# Patient Record
Sex: FEMALE | Race: WHITE | NOT HISPANIC OR LATINO | URBAN - METROPOLITAN AREA
[De-identification: names, ages, dates, MRNs, and addresses within clinical notes are randomized per-mention and may not be internally consistent; named-entity substitution may affect disease eponyms.]

---

## 2018-01-14 ENCOUNTER — INPATIENT (INPATIENT)
Facility: HOSPITAL | Age: 33
LOS: 4 days | Discharge: HOME | End: 2018-01-19
Attending: INTERNAL MEDICINE | Admitting: INTERNAL MEDICINE
Payer: COMMERCIAL

## 2018-01-14 VITALS
DIASTOLIC BLOOD PRESSURE: 75 MMHG | RESPIRATION RATE: 18 BRPM | TEMPERATURE: 98 F | OXYGEN SATURATION: 100 % | HEART RATE: 87 BPM | SYSTOLIC BLOOD PRESSURE: 133 MMHG

## 2018-01-14 DIAGNOSIS — Z90.49 ACQUIRED ABSENCE OF OTHER SPECIFIED PARTS OF DIGESTIVE TRACT: Chronic | ICD-10-CM

## 2018-01-14 DIAGNOSIS — Z98.84 BARIATRIC SURGERY STATUS: Chronic | ICD-10-CM

## 2018-01-14 DIAGNOSIS — F32.9 MAJOR DEPRESSIVE DISORDER, SINGLE EPISODE, UNSPECIFIED: Chronic | ICD-10-CM

## 2018-01-14 LAB
ALBUMIN SERPL ELPH-MCNC: 3.9 G/DL — SIGNIFICANT CHANGE UP (ref 3.3–5)
ALP SERPL-CCNC: 60 U/L — SIGNIFICANT CHANGE UP (ref 40–120)
ALT FLD-CCNC: 10 U/L — SIGNIFICANT CHANGE UP (ref 4–33)
APPEARANCE UR: CLEAR — SIGNIFICANT CHANGE UP
AST SERPL-CCNC: 11 U/L — SIGNIFICANT CHANGE UP (ref 4–32)
BASE EXCESS BLDV CALC-SCNC: 2 MMOL/L — SIGNIFICANT CHANGE UP
BASOPHILS # BLD AUTO: 0.07 K/UL — SIGNIFICANT CHANGE UP (ref 0–0.2)
BASOPHILS NFR BLD AUTO: 0.5 % — SIGNIFICANT CHANGE UP (ref 0–2)
BILIRUB SERPL-MCNC: 0.2 MG/DL — SIGNIFICANT CHANGE UP (ref 0.2–1.2)
BILIRUB UR-MCNC: NEGATIVE — SIGNIFICANT CHANGE UP
BLOOD GAS VENOUS - CREATININE: 0.64 MG/DL — SIGNIFICANT CHANGE UP (ref 0.5–1.3)
BLOOD UR QL VISUAL: NEGATIVE — SIGNIFICANT CHANGE UP
BUN SERPL-MCNC: 17 MG/DL — SIGNIFICANT CHANGE UP (ref 7–23)
CALCIUM SERPL-MCNC: 8.8 MG/DL — SIGNIFICANT CHANGE UP (ref 8.4–10.5)
CHLORIDE BLDV-SCNC: 110 MMOL/L — HIGH (ref 96–108)
CHLORIDE SERPL-SCNC: 112 MMOL/L — HIGH (ref 98–107)
CO2 SERPL-SCNC: 24 MMOL/L — SIGNIFICANT CHANGE UP (ref 22–31)
COLOR SPEC: YELLOW — SIGNIFICANT CHANGE UP
CREAT SERPL-MCNC: 0.76 MG/DL — SIGNIFICANT CHANGE UP (ref 0.5–1.3)
EOSINOPHIL # BLD AUTO: 0.28 K/UL — SIGNIFICANT CHANGE UP (ref 0–0.5)
EOSINOPHIL NFR BLD AUTO: 2 % — SIGNIFICANT CHANGE UP (ref 0–6)
GAS PNL BLDV: 137 MMOL/L — SIGNIFICANT CHANGE UP (ref 136–146)
GLUCOSE BLDV-MCNC: 84 — SIGNIFICANT CHANGE UP (ref 70–99)
GLUCOSE SERPL-MCNC: 82 MG/DL — SIGNIFICANT CHANGE UP (ref 70–99)
GLUCOSE UR-MCNC: NEGATIVE — SIGNIFICANT CHANGE UP
HCO3 BLDV-SCNC: 25 MMOL/L — SIGNIFICANT CHANGE UP (ref 20–27)
HCT VFR BLD CALC: 33.3 % — LOW (ref 34.5–45)
HCT VFR BLDV CALC: 34.6 % — SIGNIFICANT CHANGE UP (ref 34.5–45)
HGB BLD-MCNC: 11.2 G/DL — LOW (ref 11.5–15.5)
HGB BLDV-MCNC: 11.2 G/DL — LOW (ref 11.5–15.5)
IMM GRANULOCYTES # BLD AUTO: 0.05 # — SIGNIFICANT CHANGE UP
IMM GRANULOCYTES NFR BLD AUTO: 0.4 % — SIGNIFICANT CHANGE UP (ref 0–1.5)
KETONES UR-MCNC: NEGATIVE — SIGNIFICANT CHANGE UP
LACTATE BLDV-MCNC: 0.8 MMOL/L — SIGNIFICANT CHANGE UP (ref 0.5–2)
LEUKOCYTE ESTERASE UR-ACNC: NEGATIVE — SIGNIFICANT CHANGE UP
LYMPHOCYTES # BLD AUTO: 29.8 % — SIGNIFICANT CHANGE UP (ref 13–44)
LYMPHOCYTES # BLD AUTO: 4.17 K/UL — HIGH (ref 1–3.3)
MCHC RBC-ENTMCNC: 30.3 PG — SIGNIFICANT CHANGE UP (ref 27–34)
MCHC RBC-ENTMCNC: 33.6 % — SIGNIFICANT CHANGE UP (ref 32–36)
MCV RBC AUTO: 90 FL — SIGNIFICANT CHANGE UP (ref 80–100)
MONOCYTES # BLD AUTO: 1.15 K/UL — HIGH (ref 0–0.9)
MONOCYTES NFR BLD AUTO: 8.2 % — SIGNIFICANT CHANGE UP (ref 2–14)
MUCOUS THREADS # UR AUTO: SIGNIFICANT CHANGE UP
NEUTROPHILS # BLD AUTO: 8.28 K/UL — HIGH (ref 1.8–7.4)
NEUTROPHILS NFR BLD AUTO: 59.1 % — SIGNIFICANT CHANGE UP (ref 43–77)
NITRITE UR-MCNC: NEGATIVE — SIGNIFICANT CHANGE UP
NRBC # FLD: 0 — SIGNIFICANT CHANGE UP
PCO2 BLDV: 48 MMHG — SIGNIFICANT CHANGE UP (ref 41–51)
PH BLDV: 7.37 PH — SIGNIFICANT CHANGE UP (ref 7.32–7.43)
PH UR: 6.5 — SIGNIFICANT CHANGE UP (ref 4.6–8)
PLATELET # BLD AUTO: 285 K/UL — SIGNIFICANT CHANGE UP (ref 150–400)
PMV BLD: 10.7 FL — SIGNIFICANT CHANGE UP (ref 7–13)
PO2 BLDV: 27 MMHG — LOW (ref 35–40)
POTASSIUM BLDV-SCNC: 3.7 MMOL/L — SIGNIFICANT CHANGE UP (ref 3.4–4.5)
POTASSIUM SERPL-MCNC: 4.2 MMOL/L — SIGNIFICANT CHANGE UP (ref 3.5–5.3)
POTASSIUM SERPL-SCNC: 4.2 MMOL/L — SIGNIFICANT CHANGE UP (ref 3.5–5.3)
PROT SERPL-MCNC: 6.7 G/DL — SIGNIFICANT CHANGE UP (ref 6–8.3)
PROT UR-MCNC: 30 MG/DL — HIGH
RBC # BLD: 3.7 M/UL — LOW (ref 3.8–5.2)
RBC # FLD: 12.7 % — SIGNIFICANT CHANGE UP (ref 10.3–14.5)
RBC CASTS # UR COMP ASSIST: SIGNIFICANT CHANGE UP (ref 0–?)
SAO2 % BLDV: 50.5 % — LOW (ref 60–85)
SODIUM SERPL-SCNC: 147 MMOL/L — HIGH (ref 135–145)
SP GR SPEC: 1.03 — SIGNIFICANT CHANGE UP (ref 1–1.04)
SQUAMOUS # UR AUTO: SIGNIFICANT CHANGE UP
UROBILINOGEN FLD QL: 1 MG/DL — SIGNIFICANT CHANGE UP
WBC # BLD: 14 K/UL — HIGH (ref 3.8–10.5)
WBC # FLD AUTO: 14 K/UL — HIGH (ref 3.8–10.5)
WBC UR QL: SIGNIFICANT CHANGE UP (ref 0–?)

## 2018-01-14 PROCEDURE — 76830 TRANSVAGINAL US NON-OB: CPT | Mod: 26

## 2018-01-14 RX ORDER — SODIUM CHLORIDE 9 MG/ML
1000 INJECTION INTRAMUSCULAR; INTRAVENOUS; SUBCUTANEOUS ONCE
Qty: 0 | Refills: 0 | Status: COMPLETED | OUTPATIENT
Start: 2018-01-14 | End: 2018-01-14

## 2018-01-14 RX ORDER — MORPHINE SULFATE 50 MG/1
4 CAPSULE, EXTENDED RELEASE ORAL ONCE
Qty: 0 | Refills: 0 | Status: DISCONTINUED | OUTPATIENT
Start: 2018-01-14 | End: 2018-01-14

## 2018-01-14 RX ORDER — ONDANSETRON 8 MG/1
4 TABLET, FILM COATED ORAL ONCE
Qty: 0 | Refills: 0 | Status: COMPLETED | OUTPATIENT
Start: 2018-01-14 | End: 2018-01-14

## 2018-01-14 RX ADMIN — MORPHINE SULFATE 4 MILLIGRAM(S): 50 CAPSULE, EXTENDED RELEASE ORAL at 23:50

## 2018-01-14 RX ADMIN — ONDANSETRON 4 MILLIGRAM(S): 8 TABLET, FILM COATED ORAL at 22:45

## 2018-01-14 RX ADMIN — SODIUM CHLORIDE 1000 MILLILITER(S): 9 INJECTION INTRAMUSCULAR; INTRAVENOUS; SUBCUTANEOUS at 22:35

## 2018-01-14 RX ADMIN — MORPHINE SULFATE 4 MILLIGRAM(S): 50 CAPSULE, EXTENDED RELEASE ORAL at 22:45

## 2018-01-14 NOTE — ED ADULT NURSE NOTE - OBJECTIVE STATEMENT
Received pt in spot INTAKE 1. AA0X3. C/o llq pain since this AM with 4 episodes of loose stools. Also c/o nausea, denies vomiting. Vs as noted. 20G IV placed to right AC, labs sent. Will monitor.

## 2018-01-14 NOTE — ED ADULT TRIAGE NOTE - CHIEF COMPLAINT QUOTE
states " I have left lower abdominal pain started since today " h/o Gastric sleeve 3 years ago. LMP 1 week ago . denies n/v/d.. had 2 -3 soft stools today

## 2018-01-14 NOTE — ED PROVIDER NOTE - MEDICAL DECISION MAKING DETAILS
33y/o F with PMHx of PCOS, PSHx of cholecystectomy, gastric sleeve presents to the ED with pelvic pain beginning today -- possible ovarian cyst. R/o ovarian torsion -- labs, UA, urine cx, TV-US. If US negative, consider CT.

## 2018-01-14 NOTE — ED PROVIDER NOTE - ATTENDING CONTRIBUTION TO CARE
DR. GONZALEZ, ATTENDING MD-  I performed a face to face bedside interview with patient regarding history of present illness, review of symptoms and past medical history. I completed an independent physical exam.  I have discussed patient's plan of care with PA.   Documentation as above in the note.   HPI: 31y/o F with PMHx of PCOS, PSHx of cholecystectomy, gastric sleeve presents to the ED with pelvic pain beginning today.  EXAM: Well appearing, NAD, tenderness to palpation LLQ, no rebound, no guarding.   MDM: CT obtained, shows uncomplicated Diverticulitis without asbcess. Pt labs shows WBC 14 but otherwise unremarkable. WIll admit for pain control and unrermittent nausea. Not tolerating PO.

## 2018-01-14 NOTE — ED PROVIDER NOTE - PROGRESS NOTE DETAILS
KISHOR TIERNEY:  Pt with persistent pain and nausea.  Pt admitted to Dr. Molina.  MAR text paged at this time.

## 2018-01-14 NOTE — ED PROVIDER NOTE - OBJECTIVE STATEMENT
33y/o F with PMHx of PCOS, PSHx of cholecystectomy, gastric sleeve presents to the ED with pelvic pain beginning today. Pt notes gradual onset, constant, waxing and waning, sharp, L sided pelvic pain associated with nausea. Her pain is worse when sitting up, with movement, and with positional change. She notes several episodes of loose stools today. Denies fevers/chills, vomiting, chest pain, abdominal distension, BRBPP, melena, dysuria, cloudy urine, vaginal discharge, vaginal bleeding, history of STDs, any other complaints. NKDA.

## 2018-01-14 NOTE — ED PROVIDER NOTE - CHPI ED SYMPTOMS NEG
no blood in stool/no abdominal distension/no chills/no chest pain, no melena, no vaginal discharge, no vaginal bleeding/no fever/no dysuria/no vomiting

## 2018-01-14 NOTE — ED PROVIDER NOTE - NS_EDPROVIDERDISPOUSERTYPE_ED_A_ED
Scribe Attestation (For Scribes USE Only)... Scribe Attestation (For Scribes USE Only).../I have personally evaluated and examined the patient. The Attending was available to me as a supervising provider if needed. Scribe Attestation (For Scribes USE Only).../Attending Attestation (For Attendings USE Only)...

## 2018-01-15 DIAGNOSIS — K57.92 DIVERTICULITIS OF INTESTINE, PART UNSPECIFIED, WITHOUT PERFORATION OR ABSCESS WITHOUT BLEEDING: ICD-10-CM

## 2018-01-15 DIAGNOSIS — Z98.890 OTHER SPECIFIED POSTPROCEDURAL STATES: Chronic | ICD-10-CM

## 2018-01-15 DIAGNOSIS — F32.9 MAJOR DEPRESSIVE DISORDER, SINGLE EPISODE, UNSPECIFIED: ICD-10-CM

## 2018-01-15 DIAGNOSIS — D64.9 ANEMIA, UNSPECIFIED: ICD-10-CM

## 2018-01-15 PROCEDURE — 99223 1ST HOSP IP/OBS HIGH 75: CPT

## 2018-01-15 PROCEDURE — 74177 CT ABD & PELVIS W/CONTRAST: CPT | Mod: 26

## 2018-01-15 RX ORDER — ONDANSETRON 8 MG/1
4 TABLET, FILM COATED ORAL EVERY 6 HOURS
Qty: 0 | Refills: 0 | Status: DISCONTINUED | OUTPATIENT
Start: 2018-01-15 | End: 2018-01-15

## 2018-01-15 RX ORDER — ACETAMINOPHEN 500 MG
1000 TABLET ORAL ONCE
Qty: 0 | Refills: 0 | Status: COMPLETED | OUTPATIENT
Start: 2018-01-15 | End: 2018-01-15

## 2018-01-15 RX ORDER — MORPHINE SULFATE 50 MG/1
4 CAPSULE, EXTENDED RELEASE ORAL EVERY 6 HOURS
Qty: 0 | Refills: 0 | Status: DISCONTINUED | OUTPATIENT
Start: 2018-01-15 | End: 2018-01-19

## 2018-01-15 RX ORDER — ONDANSETRON 8 MG/1
4 TABLET, FILM COATED ORAL ONCE
Qty: 0 | Refills: 0 | Status: COMPLETED | OUTPATIENT
Start: 2018-01-15 | End: 2018-01-15

## 2018-01-15 RX ORDER — SODIUM CHLORIDE 9 MG/ML
1000 INJECTION INTRAMUSCULAR; INTRAVENOUS; SUBCUTANEOUS ONCE
Qty: 0 | Refills: 0 | Status: COMPLETED | OUTPATIENT
Start: 2018-01-15 | End: 2018-01-15

## 2018-01-15 RX ORDER — ACETAMINOPHEN 500 MG
650 TABLET ORAL EVERY 6 HOURS
Qty: 0 | Refills: 0 | Status: DISCONTINUED | OUTPATIENT
Start: 2018-01-15 | End: 2018-01-15

## 2018-01-15 RX ORDER — ONDANSETRON 8 MG/1
4 TABLET, FILM COATED ORAL EVERY 6 HOURS
Qty: 0 | Refills: 0 | Status: DISCONTINUED | OUTPATIENT
Start: 2018-01-15 | End: 2018-01-19

## 2018-01-15 RX ORDER — INFLUENZA VIRUS VACCINE 15; 15; 15; 15 UG/.5ML; UG/.5ML; UG/.5ML; UG/.5ML
0.5 SUSPENSION INTRAMUSCULAR ONCE
Qty: 0 | Refills: 0 | Status: COMPLETED | OUTPATIENT
Start: 2018-01-15 | End: 2018-01-19

## 2018-01-15 RX ORDER — METRONIDAZOLE 500 MG
500 TABLET ORAL ONCE
Qty: 0 | Refills: 0 | Status: COMPLETED | OUTPATIENT
Start: 2018-01-15 | End: 2018-01-15

## 2018-01-15 RX ORDER — OXYCODONE AND ACETAMINOPHEN 5; 325 MG/1; MG/1
1 TABLET ORAL EVERY 6 HOURS
Qty: 0 | Refills: 0 | Status: DISCONTINUED | OUTPATIENT
Start: 2018-01-15 | End: 2018-01-19

## 2018-01-15 RX ORDER — SERTRALINE 25 MG/1
100 TABLET, FILM COATED ORAL DAILY
Qty: 0 | Refills: 0 | Status: DISCONTINUED | OUTPATIENT
Start: 2018-01-15 | End: 2018-01-19

## 2018-01-15 RX ORDER — SODIUM CHLORIDE 9 MG/ML
1000 INJECTION INTRAMUSCULAR; INTRAVENOUS; SUBCUTANEOUS
Qty: 0 | Refills: 0 | Status: DISCONTINUED | OUTPATIENT
Start: 2018-01-15 | End: 2018-01-19

## 2018-01-15 RX ORDER — METRONIDAZOLE 500 MG
500 TABLET ORAL EVERY 8 HOURS
Qty: 0 | Refills: 0 | Status: DISCONTINUED | OUTPATIENT
Start: 2018-01-15 | End: 2018-01-19

## 2018-01-15 RX ORDER — MORPHINE SULFATE 50 MG/1
4 CAPSULE, EXTENDED RELEASE ORAL ONCE
Qty: 0 | Refills: 0 | Status: DISCONTINUED | OUTPATIENT
Start: 2018-01-15 | End: 2018-01-15

## 2018-01-15 RX ORDER — CIPROFLOXACIN LACTATE 400MG/40ML
400 VIAL (ML) INTRAVENOUS EVERY 12 HOURS
Qty: 0 | Refills: 0 | Status: DISCONTINUED | OUTPATIENT
Start: 2018-01-15 | End: 2018-01-19

## 2018-01-15 RX ORDER — CIPROFLOXACIN LACTATE 400MG/40ML
400 VIAL (ML) INTRAVENOUS ONCE
Qty: 0 | Refills: 0 | Status: COMPLETED | OUTPATIENT
Start: 2018-01-15 | End: 2018-01-15

## 2018-01-15 RX ADMIN — ONDANSETRON 4 MILLIGRAM(S): 8 TABLET, FILM COATED ORAL at 09:30

## 2018-01-15 RX ADMIN — ONDANSETRON 4 MILLIGRAM(S): 8 TABLET, FILM COATED ORAL at 12:50

## 2018-01-15 RX ADMIN — SERTRALINE 100 MILLIGRAM(S): 25 TABLET, FILM COATED ORAL at 12:30

## 2018-01-15 RX ADMIN — Medication 650 MILLIGRAM(S): at 07:05

## 2018-01-15 RX ADMIN — ONDANSETRON 4 MILLIGRAM(S): 8 TABLET, FILM COATED ORAL at 18:46

## 2018-01-15 RX ADMIN — Medication 200 MILLIGRAM(S): at 18:04

## 2018-01-15 RX ADMIN — SODIUM CHLORIDE 1000 MILLILITER(S): 9 INJECTION INTRAMUSCULAR; INTRAVENOUS; SUBCUTANEOUS at 05:32

## 2018-01-15 RX ADMIN — SODIUM CHLORIDE 100 MILLILITER(S): 9 INJECTION INTRAMUSCULAR; INTRAVENOUS; SUBCUTANEOUS at 10:47

## 2018-01-15 RX ADMIN — Medication 100 MILLIGRAM(S): at 21:37

## 2018-01-15 RX ADMIN — MORPHINE SULFATE 4 MILLIGRAM(S): 50 CAPSULE, EXTENDED RELEASE ORAL at 03:07

## 2018-01-15 RX ADMIN — SODIUM CHLORIDE 100 MILLILITER(S): 9 INJECTION INTRAMUSCULAR; INTRAVENOUS; SUBCUTANEOUS at 11:37

## 2018-01-15 RX ADMIN — Medication 200 MILLIGRAM(S): at 03:07

## 2018-01-15 RX ADMIN — SODIUM CHLORIDE 1000 MILLILITER(S): 9 INJECTION INTRAMUSCULAR; INTRAVENOUS; SUBCUTANEOUS at 00:50

## 2018-01-15 RX ADMIN — SODIUM CHLORIDE 2000 MILLILITER(S): 9 INJECTION INTRAMUSCULAR; INTRAVENOUS; SUBCUTANEOUS at 09:30

## 2018-01-15 RX ADMIN — Medication 1000 MILLIGRAM(S): at 02:35

## 2018-01-15 RX ADMIN — MORPHINE SULFATE 4 MILLIGRAM(S): 50 CAPSULE, EXTENDED RELEASE ORAL at 03:15

## 2018-01-15 RX ADMIN — OXYCODONE AND ACETAMINOPHEN 1 TABLET(S): 5; 325 TABLET ORAL at 17:45

## 2018-01-15 RX ADMIN — Medication 100 MILLIGRAM(S): at 04:13

## 2018-01-15 RX ADMIN — Medication 400 MILLIGRAM(S): at 00:50

## 2018-01-15 RX ADMIN — ONDANSETRON 4 MILLIGRAM(S): 8 TABLET, FILM COATED ORAL at 03:07

## 2018-01-15 RX ADMIN — OXYCODONE AND ACETAMINOPHEN 1 TABLET(S): 5; 325 TABLET ORAL at 23:11

## 2018-01-15 RX ADMIN — OXYCODONE AND ACETAMINOPHEN 1 TABLET(S): 5; 325 TABLET ORAL at 12:30

## 2018-01-15 RX ADMIN — Medication 650 MILLIGRAM(S): at 08:13

## 2018-01-15 RX ADMIN — OXYCODONE AND ACETAMINOPHEN 1 TABLET(S): 5; 325 TABLET ORAL at 16:50

## 2018-01-15 RX ADMIN — OXYCODONE AND ACETAMINOPHEN 1 TABLET(S): 5; 325 TABLET ORAL at 11:37

## 2018-01-15 RX ADMIN — Medication 100 MILLIGRAM(S): at 13:49

## 2018-01-15 RX ADMIN — SODIUM CHLORIDE 100 MILLILITER(S): 9 INJECTION INTRAMUSCULAR; INTRAVENOUS; SUBCUTANEOUS at 21:37

## 2018-01-15 NOTE — H&P ADULT - NSHPLABSRESULTS_GEN_ALL_CORE
labs reviewed significant for leukocytosis, anemia, mildly elevated Na and chloride level  CT a/p and pelvic sono results reviewed

## 2018-01-15 NOTE — H&P ADULT - ASSESSMENT
2yof with h/o PCOS, Depression, laminectomy, gastric sleeve, cholecystectomy a/w acute diverticulitis.

## 2018-01-15 NOTE — H&P ADULT - NSHPSOCIALHISTORY_GEN_ALL_CORE
lives with  and children, smokes 1/2 ppd on and off for 17yrs, drinks wine occasionally, no drug use, works as an  for [employer]

## 2018-01-15 NOTE — H&P ADULT - PSH
delivery delivered  x 2  Depression    H/O laminectomy    History of cholecystectomy    S/P gastric bypass

## 2018-01-15 NOTE — ED ADULT NURSE REASSESSMENT NOTE - NS ED NURSE REASSESS COMMENT FT1
MAR MD aware of pt blood pressure. pt asymptomatic at this time. denies dizziness or lightheadedness. NAD Noted. will monitor.

## 2018-01-15 NOTE — H&P ADULT - PMH
Depression    Gestational age 37 or more weeks  Gestational Diabetes  Herniated nucleus pulposus    PCOS (polycystic ovarian syndrome)

## 2018-01-15 NOTE — H&P ADULT - FAMILY HISTORY
Sibling  Still living? Yes, Estimated age: Age Unknown  Family history of thyroid cancer, Age at diagnosis: Age Unknown  Family history of diabetes mellitus, Age at diagnosis: Age Unknown     Mother  Still living? Unknown  Family history of diabetes mellitus, Age at diagnosis: Age Unknown

## 2018-01-15 NOTE — H&P ADULT - HISTORY OF PRESENT ILLNESS
32yof with h/o PCOS, Depression, laminectomy, gastric sleeve, cholecystectomy came in c/o having left sided lower abdominal pain since yesterday morning, describes as constant, sharp 8/10 pain without any radiation, associated with nausea, 2 episodes of non bloody vomiting, chills and multiple episodes of loose stools. Per patient pain is worse with position change, better with applying pressure to the area, did not try any pain meds. No reports of any fever, cough, sick contact, sob, chest pain, dysuria, vaginal discharge, melena, BRBPR or any other complaints.    In the ED pt had pelvic sono which was neg, and ct a/p showed acute diverticulitis, was given 3 lits NS, 1 dose of cipro and flagyl, morphine and zofran and admitted to medicine for further management.

## 2018-01-15 NOTE — H&P ADULT - PROBLEM SELECTOR PLAN 1
cont cipro, flagyl, antiemetics, pain control, pt wants to try regular diet will order diet, iv hydration cont cipro, flagyl, antiemetics, pain control, pt wants to try regular diet will order diet, iv hydration.

## 2018-01-16 ENCOUNTER — TRANSCRIPTION ENCOUNTER (OUTPATIENT)
Age: 33
End: 2018-01-16

## 2018-01-16 LAB
BACTERIA UR CULT: SIGNIFICANT CHANGE UP
BUN SERPL-MCNC: 8 MG/DL — SIGNIFICANT CHANGE UP (ref 7–23)
CALCIUM SERPL-MCNC: 7.9 MG/DL — LOW (ref 8.4–10.5)
CHLORIDE SERPL-SCNC: 107 MMOL/L — SIGNIFICANT CHANGE UP (ref 98–107)
CO2 SERPL-SCNC: 24 MMOL/L — SIGNIFICANT CHANGE UP (ref 22–31)
CREAT SERPL-MCNC: 0.53 MG/DL — SIGNIFICANT CHANGE UP (ref 0.5–1.3)
FERRITIN SERPL-MCNC: 81.5 NG/ML — SIGNIFICANT CHANGE UP (ref 15–150)
GLUCOSE SERPL-MCNC: 67 MG/DL — LOW (ref 70–99)
HCT VFR BLD CALC: 29.9 % — LOW (ref 34.5–45)
HGB BLD-MCNC: 9.5 G/DL — LOW (ref 11.5–15.5)
IRON SATN MFR SERPL: 222 UG/DL — SIGNIFICANT CHANGE UP (ref 140–530)
IRON SATN MFR SERPL: 48 UG/DL — SIGNIFICANT CHANGE UP (ref 30–160)
MCHC RBC-ENTMCNC: 28.9 PG — SIGNIFICANT CHANGE UP (ref 27–34)
MCHC RBC-ENTMCNC: 31.8 % — LOW (ref 32–36)
MCV RBC AUTO: 90.9 FL — SIGNIFICANT CHANGE UP (ref 80–100)
NRBC # FLD: 0 — SIGNIFICANT CHANGE UP
PLATELET # BLD AUTO: 254 K/UL — SIGNIFICANT CHANGE UP (ref 150–400)
PMV BLD: 11.1 FL — SIGNIFICANT CHANGE UP (ref 7–13)
POTASSIUM SERPL-MCNC: 3.9 MMOL/L — SIGNIFICANT CHANGE UP (ref 3.5–5.3)
POTASSIUM SERPL-SCNC: 3.9 MMOL/L — SIGNIFICANT CHANGE UP (ref 3.5–5.3)
RBC # BLD: 3.29 M/UL — LOW (ref 3.8–5.2)
RBC # FLD: 12.9 % — SIGNIFICANT CHANGE UP (ref 10.3–14.5)
SODIUM SERPL-SCNC: 138 MMOL/L — SIGNIFICANT CHANGE UP (ref 135–145)
SPECIMEN SOURCE: SIGNIFICANT CHANGE UP
UIBC SERPL-MCNC: 174 UG/DL — SIGNIFICANT CHANGE UP (ref 110–370)
WBC # BLD: 8.95 K/UL — SIGNIFICANT CHANGE UP (ref 3.8–10.5)
WBC # FLD AUTO: 8.95 K/UL — SIGNIFICANT CHANGE UP (ref 3.8–10.5)

## 2018-01-16 RX ORDER — POLYETHYLENE GLYCOL 3350 17 G/17G
17 POWDER, FOR SOLUTION ORAL DAILY
Qty: 0 | Refills: 0 | Status: DISCONTINUED | OUTPATIENT
Start: 2018-01-16 | End: 2018-01-19

## 2018-01-16 RX ORDER — SENNA PLUS 8.6 MG/1
2 TABLET ORAL AT BEDTIME
Qty: 0 | Refills: 0 | Status: DISCONTINUED | OUTPATIENT
Start: 2018-01-16 | End: 2018-01-19

## 2018-01-16 RX ADMIN — OXYCODONE AND ACETAMINOPHEN 1 TABLET(S): 5; 325 TABLET ORAL at 06:02

## 2018-01-16 RX ADMIN — OXYCODONE AND ACETAMINOPHEN 1 TABLET(S): 5; 325 TABLET ORAL at 17:07

## 2018-01-16 RX ADMIN — Medication 100 MILLIGRAM(S): at 14:20

## 2018-01-16 RX ADMIN — POLYETHYLENE GLYCOL 3350 17 GRAM(S): 17 POWDER, FOR SOLUTION ORAL at 23:54

## 2018-01-16 RX ADMIN — SERTRALINE 100 MILLIGRAM(S): 25 TABLET, FILM COATED ORAL at 11:50

## 2018-01-16 RX ADMIN — Medication 100 MILLIGRAM(S): at 21:52

## 2018-01-16 RX ADMIN — OXYCODONE AND ACETAMINOPHEN 1 TABLET(S): 5; 325 TABLET ORAL at 00:00

## 2018-01-16 RX ADMIN — SODIUM CHLORIDE 100 MILLILITER(S): 9 INJECTION INTRAMUSCULAR; INTRAVENOUS; SUBCUTANEOUS at 08:48

## 2018-01-16 RX ADMIN — OXYCODONE AND ACETAMINOPHEN 1 TABLET(S): 5; 325 TABLET ORAL at 11:52

## 2018-01-16 RX ADMIN — Medication 200 MILLIGRAM(S): at 07:18

## 2018-01-16 RX ADMIN — Medication 200 MILLIGRAM(S): at 17:11

## 2018-01-16 RX ADMIN — ONDANSETRON 4 MILLIGRAM(S): 8 TABLET, FILM COATED ORAL at 18:45

## 2018-01-16 RX ADMIN — SENNA PLUS 2 TABLET(S): 8.6 TABLET ORAL at 23:54

## 2018-01-16 RX ADMIN — ONDANSETRON 4 MILLIGRAM(S): 8 TABLET, FILM COATED ORAL at 08:47

## 2018-01-16 RX ADMIN — OXYCODONE AND ACETAMINOPHEN 1 TABLET(S): 5; 325 TABLET ORAL at 18:04

## 2018-01-16 RX ADMIN — OXYCODONE AND ACETAMINOPHEN 1 TABLET(S): 5; 325 TABLET ORAL at 07:00

## 2018-01-16 RX ADMIN — Medication 100 MILLIGRAM(S): at 06:01

## 2018-01-16 RX ADMIN — OXYCODONE AND ACETAMINOPHEN 1 TABLET(S): 5; 325 TABLET ORAL at 12:45

## 2018-01-16 NOTE — DISCHARGE NOTE ADULT - CARE PLAN
Principal Discharge DX:	Diverticulitis  Secondary Diagnosis:	Depression Principal Discharge DX:	Diverticulitis  Secondary Diagnosis:	Depression  Secondary Diagnosis:	Anemia, unspecified type Principal Discharge DX:	Diverticulitis  Goal:	reduction of pain /inflammation  Assessment and plan of treatment:	finish course of antiobiotics   Follow up with primary Dr. Evi puente constipation  Secondary Diagnosis:	Depression  Secondary Diagnosis:	Anemia, unspecified type Principal Discharge DX:	Diverticulitis  Goal:	reduction of pain /inflammation  Assessment and plan of treatment:	finish course of antibiotics   Follow up with primary Dr. Evi puente constipation  Secondary Diagnosis:	Depression  Assessment and plan of treatment:	continue medications  Secondary Diagnosis:	Anemia, unspecified type  Assessment and plan of treatment:	Follow up with Primary MD for blood work

## 2018-01-16 NOTE — DISCHARGE NOTE ADULT - CARE PROVIDER_API CALL
Kike Steve), Cardiovascular Disease; Internal Medicine  [facility] [address]  Phone: [phone]  Fax: [phone]

## 2018-01-16 NOTE — DISCHARGE NOTE ADULT - PLAN OF CARE
reduction of pain /inflammation finish course of antiobiotics   Follow up with primary Dr. Steve   avoid constipation finish course of antibiotics   Follow up with primary Dr. Steve   avoid constipation continue medications Follow up with Primary MD for blood work

## 2018-01-16 NOTE — DISCHARGE NOTE ADULT - HOSPITAL COURSE
32yof with h/o PCOS, Depression, laminectomy, gastric sleeve, cholecystectomy a/w acute diverticulitis.  IV flagyl/cipro    dispo: to home 32yof with h/o PCOS, Depression, laminectomy, gastric sleeve, cholecystectomy a/w acute diverticulitis.  IV flagyl/cipro  Pt maintained on clear liquids and advanced to regular diet and tolerated   Leukocytosis resolved   anemia improved   Pt to follow up with PMD and later GI for colonsocopy and follow up     dispo: to home

## 2018-01-16 NOTE — DISCHARGE NOTE ADULT - PATIENT PORTAL LINK FT
“You can access the [facility] Patient Portal, offered by [facility], by registering with the following website: [email or web address]”

## 2018-01-16 NOTE — DISCHARGE NOTE ADULT - MEDICATION SUMMARY - MEDICATIONS TO TAKE
I will START or STAY ON the medications listed below when I get home from the hospital:    birth control  --     -- Indication: For birth control     Zoloft 100 mg oral tablet  -- 1 tab(s) by mouth once a day  -- Indication: For Depression    senna oral tablet  -- 2 tab(s) by mouth once a day (at bedtime)  -- Indication: For constipation     polyethylene glycol 3350 oral powder for reconstitution  -- 17 gram(s) by mouth once a day  -- Indication: For constipation     amoxicillin-clavulanate 875 mg-125 mg oral tablet  -- 1 tab(s) by mouth 2 times a day  -- Indication: For Diverticulitis

## 2018-01-17 LAB
BUN SERPL-MCNC: 5 MG/DL — LOW (ref 7–23)
CALCIUM SERPL-MCNC: 8.2 MG/DL — LOW (ref 8.4–10.5)
CHLORIDE SERPL-SCNC: 108 MMOL/L — HIGH (ref 98–107)
CO2 SERPL-SCNC: 24 MMOL/L — SIGNIFICANT CHANGE UP (ref 22–31)
CREAT SERPL-MCNC: 0.52 MG/DL — SIGNIFICANT CHANGE UP (ref 0.5–1.3)
FOLATE SERPL-MCNC: 17 NG/ML — SIGNIFICANT CHANGE UP (ref 4.7–20)
GLUCOSE SERPL-MCNC: 77 MG/DL — SIGNIFICANT CHANGE UP (ref 70–99)
HAPTOGLOB SERPL-MCNC: 130 MG/DL — SIGNIFICANT CHANGE UP (ref 34–200)
HCT VFR BLD CALC: 28.9 % — LOW (ref 34.5–45)
HGB BLD-MCNC: 9.6 G/DL — LOW (ref 11.5–15.5)
MCHC RBC-ENTMCNC: 29.3 PG — SIGNIFICANT CHANGE UP (ref 27–34)
MCHC RBC-ENTMCNC: 33.2 % — SIGNIFICANT CHANGE UP (ref 32–36)
MCV RBC AUTO: 88.1 FL — SIGNIFICANT CHANGE UP (ref 80–100)
NRBC # FLD: 0 — SIGNIFICANT CHANGE UP
PLATELET # BLD AUTO: 249 K/UL — SIGNIFICANT CHANGE UP (ref 150–400)
PMV BLD: 11.2 FL — SIGNIFICANT CHANGE UP (ref 7–13)
POTASSIUM SERPL-MCNC: 4.1 MMOL/L — SIGNIFICANT CHANGE UP (ref 3.5–5.3)
POTASSIUM SERPL-SCNC: 4.1 MMOL/L — SIGNIFICANT CHANGE UP (ref 3.5–5.3)
RBC # BLD: 3.28 M/UL — LOW (ref 3.8–5.2)
RBC # FLD: 12.5 % — SIGNIFICANT CHANGE UP (ref 10.3–14.5)
SODIUM SERPL-SCNC: 140 MMOL/L — SIGNIFICANT CHANGE UP (ref 135–145)
TSH SERPL-MCNC: 3.1 UIU/ML — SIGNIFICANT CHANGE UP (ref 0.27–4.2)
VIT B12 SERPL-MCNC: 690 PG/ML — SIGNIFICANT CHANGE UP (ref 200–900)
WBC # BLD: 8.87 K/UL — SIGNIFICANT CHANGE UP (ref 3.8–10.5)
WBC # FLD AUTO: 8.87 K/UL — SIGNIFICANT CHANGE UP (ref 3.8–10.5)

## 2018-01-17 RX ORDER — DOCUSATE SODIUM 100 MG
100 CAPSULE ORAL THREE TIMES A DAY
Qty: 0 | Refills: 0 | Status: DISCONTINUED | OUTPATIENT
Start: 2018-01-17 | End: 2018-01-19

## 2018-01-17 RX ORDER — METOCLOPRAMIDE HCL 10 MG
10 TABLET ORAL ONCE
Qty: 0 | Refills: 0 | Status: COMPLETED | OUTPATIENT
Start: 2018-01-17 | End: 2018-01-17

## 2018-01-17 RX ADMIN — Medication 10 MILLIGRAM(S): at 22:41

## 2018-01-17 RX ADMIN — Medication 200 MILLIGRAM(S): at 17:18

## 2018-01-17 RX ADMIN — Medication 100 MILLIGRAM(S): at 23:29

## 2018-01-17 RX ADMIN — Medication 100 MILLIGRAM(S): at 05:45

## 2018-01-17 RX ADMIN — MORPHINE SULFATE 4 MILLIGRAM(S): 50 CAPSULE, EXTENDED RELEASE ORAL at 11:40

## 2018-01-17 RX ADMIN — MORPHINE SULFATE 4 MILLIGRAM(S): 50 CAPSULE, EXTENDED RELEASE ORAL at 17:17

## 2018-01-17 RX ADMIN — ONDANSETRON 4 MILLIGRAM(S): 8 TABLET, FILM COATED ORAL at 02:46

## 2018-01-17 RX ADMIN — SENNA PLUS 2 TABLET(S): 8.6 TABLET ORAL at 23:29

## 2018-01-17 RX ADMIN — Medication 200 MILLIGRAM(S): at 05:45

## 2018-01-17 RX ADMIN — MORPHINE SULFATE 4 MILLIGRAM(S): 50 CAPSULE, EXTENDED RELEASE ORAL at 17:32

## 2018-01-17 RX ADMIN — POLYETHYLENE GLYCOL 3350 17 GRAM(S): 17 POWDER, FOR SOLUTION ORAL at 11:40

## 2018-01-17 RX ADMIN — Medication 100 MILLIGRAM(S): at 15:49

## 2018-01-17 RX ADMIN — Medication 100 MILLIGRAM(S): at 22:42

## 2018-01-17 RX ADMIN — SODIUM CHLORIDE 100 MILLILITER(S): 9 INJECTION INTRAMUSCULAR; INTRAVENOUS; SUBCUTANEOUS at 02:40

## 2018-01-17 RX ADMIN — SODIUM CHLORIDE 100 MILLILITER(S): 9 INJECTION INTRAMUSCULAR; INTRAVENOUS; SUBCUTANEOUS at 11:36

## 2018-01-17 RX ADMIN — OXYCODONE AND ACETAMINOPHEN 1 TABLET(S): 5; 325 TABLET ORAL at 02:11

## 2018-01-17 RX ADMIN — OXYCODONE AND ACETAMINOPHEN 1 TABLET(S): 5; 325 TABLET ORAL at 02:40

## 2018-01-17 RX ADMIN — SERTRALINE 100 MILLIGRAM(S): 25 TABLET, FILM COATED ORAL at 17:18

## 2018-01-17 RX ADMIN — ONDANSETRON 4 MILLIGRAM(S): 8 TABLET, FILM COATED ORAL at 11:35

## 2018-01-17 RX ADMIN — ONDANSETRON 4 MILLIGRAM(S): 8 TABLET, FILM COATED ORAL at 18:00

## 2018-01-17 RX ADMIN — MORPHINE SULFATE 4 MILLIGRAM(S): 50 CAPSULE, EXTENDED RELEASE ORAL at 11:55

## 2018-01-17 NOTE — CHART NOTE - NSCHARTNOTEFT_GEN_A_CORE
ADS    22:40    Called to evaluate this 32 year old female patient with h/o PCOS, depression, laminectomy, gastric sleeve, cholecystectomy a/w acute diverticulitis who is complaining of nausea/vomiting unrelieved by zofran.   Reglan 10mg IV given for N/V.  Pt receiving intravenous fluids.  Will continue to monitor response to medical treatment.     Jennifer Gonzalez ADS NP ADS    22:40    Called to evaluate this 32 year old female patient with h/o PCOS, depression, laminectomy, gastric sleeve, cholecystectomy a/w acute diverticulitis who is complaining of nausea/vomiting unrelieved by zofran.   Reglan 10mg IV given for N/V.  Pt receiving intravenous fluids.  Will continue to monitor response to medical treatment.     01:45-  pt sleeping comfortable in no distress.      Jennifer LONGO NP

## 2018-01-18 LAB
BUN SERPL-MCNC: 2 MG/DL — LOW (ref 7–23)
CALCIUM SERPL-MCNC: 8.3 MG/DL — LOW (ref 8.4–10.5)
CHLORIDE SERPL-SCNC: 105 MMOL/L — SIGNIFICANT CHANGE UP (ref 98–107)
CO2 SERPL-SCNC: 25 MMOL/L — SIGNIFICANT CHANGE UP (ref 22–31)
CREAT SERPL-MCNC: 0.52 MG/DL — SIGNIFICANT CHANGE UP (ref 0.5–1.3)
GLUCOSE SERPL-MCNC: 74 MG/DL — SIGNIFICANT CHANGE UP (ref 70–99)
HCT VFR BLD CALC: 30.6 % — LOW (ref 34.5–45)
HGB BLD-MCNC: 10.3 G/DL — LOW (ref 11.5–15.5)
MCHC RBC-ENTMCNC: 29.6 PG — SIGNIFICANT CHANGE UP (ref 27–34)
MCHC RBC-ENTMCNC: 33.7 % — SIGNIFICANT CHANGE UP (ref 32–36)
MCV RBC AUTO: 87.9 FL — SIGNIFICANT CHANGE UP (ref 80–100)
NRBC # FLD: 0 — SIGNIFICANT CHANGE UP
PLATELET # BLD AUTO: 253 K/UL — SIGNIFICANT CHANGE UP (ref 150–400)
PMV BLD: 11.1 FL — SIGNIFICANT CHANGE UP (ref 7–13)
POTASSIUM SERPL-MCNC: 3.6 MMOL/L — SIGNIFICANT CHANGE UP (ref 3.5–5.3)
POTASSIUM SERPL-SCNC: 3.6 MMOL/L — SIGNIFICANT CHANGE UP (ref 3.5–5.3)
RBC # BLD: 3.48 M/UL — LOW (ref 3.8–5.2)
RBC # FLD: 12.3 % — SIGNIFICANT CHANGE UP (ref 10.3–14.5)
SODIUM SERPL-SCNC: 139 MMOL/L — SIGNIFICANT CHANGE UP (ref 135–145)
WBC # BLD: 7.2 K/UL — SIGNIFICANT CHANGE UP (ref 3.8–10.5)
WBC # FLD AUTO: 7.2 K/UL — SIGNIFICANT CHANGE UP (ref 3.8–10.5)

## 2018-01-18 RX ADMIN — Medication 100 MILLIGRAM(S): at 22:10

## 2018-01-18 RX ADMIN — ONDANSETRON 4 MILLIGRAM(S): 8 TABLET, FILM COATED ORAL at 14:41

## 2018-01-18 RX ADMIN — Medication 100 MILLIGRAM(S): at 05:32

## 2018-01-18 RX ADMIN — SERTRALINE 100 MILLIGRAM(S): 25 TABLET, FILM COATED ORAL at 16:01

## 2018-01-18 RX ADMIN — OXYCODONE AND ACETAMINOPHEN 1 TABLET(S): 5; 325 TABLET ORAL at 17:04

## 2018-01-18 RX ADMIN — Medication 200 MILLIGRAM(S): at 17:05

## 2018-01-18 RX ADMIN — Medication 100 MILLIGRAM(S): at 13:08

## 2018-01-18 RX ADMIN — Medication 100 MILLIGRAM(S): at 13:07

## 2018-01-18 RX ADMIN — ONDANSETRON 4 MILLIGRAM(S): 8 TABLET, FILM COATED ORAL at 05:31

## 2018-01-18 RX ADMIN — Medication 100 MILLIGRAM(S): at 06:41

## 2018-01-18 RX ADMIN — ONDANSETRON 4 MILLIGRAM(S): 8 TABLET, FILM COATED ORAL at 22:17

## 2018-01-18 RX ADMIN — SENNA PLUS 2 TABLET(S): 8.6 TABLET ORAL at 22:10

## 2018-01-18 RX ADMIN — Medication 200 MILLIGRAM(S): at 06:42

## 2018-01-18 RX ADMIN — OXYCODONE AND ACETAMINOPHEN 1 TABLET(S): 5; 325 TABLET ORAL at 06:41

## 2018-01-18 RX ADMIN — OXYCODONE AND ACETAMINOPHEN 1 TABLET(S): 5; 325 TABLET ORAL at 18:05

## 2018-01-18 RX ADMIN — POLYETHYLENE GLYCOL 3350 17 GRAM(S): 17 POWDER, FOR SOLUTION ORAL at 11:37

## 2018-01-18 NOTE — CHART NOTE - NSCHARTNOTEFT_GEN_A_CORE
ADS    Evaluated patients tolerance to clear liquid diet. Pt stated that she is tolerating it well.  She denied N/V.  Advanced diet to full liquid.  Will continue to monitor patient's tolerance level.    J[facility]ONGO [facility]

## 2018-01-19 VITALS
TEMPERATURE: 98 F | DIASTOLIC BLOOD PRESSURE: 64 MMHG | HEART RATE: 80 BPM | RESPIRATION RATE: 17 BRPM | OXYGEN SATURATION: 99 % | SYSTOLIC BLOOD PRESSURE: 104 MMHG

## 2018-01-19 LAB
BUN SERPL-MCNC: 2 MG/DL — LOW (ref 7–23)
CALCIUM SERPL-MCNC: 8.1 MG/DL — LOW (ref 8.4–10.5)
CHLORIDE SERPL-SCNC: 106 MMOL/L — SIGNIFICANT CHANGE UP (ref 98–107)
CO2 SERPL-SCNC: 24 MMOL/L — SIGNIFICANT CHANGE UP (ref 22–31)
CREAT SERPL-MCNC: 0.51 MG/DL — SIGNIFICANT CHANGE UP (ref 0.5–1.3)
GLUCOSE SERPL-MCNC: 98 MG/DL — SIGNIFICANT CHANGE UP (ref 70–99)
HCT VFR BLD CALC: 29.5 % — LOW (ref 34.5–45)
HGB BLD-MCNC: 10.2 G/DL — LOW (ref 11.5–15.5)
MCHC RBC-ENTMCNC: 30.5 PG — SIGNIFICANT CHANGE UP (ref 27–34)
MCHC RBC-ENTMCNC: 34.6 % — SIGNIFICANT CHANGE UP (ref 32–36)
MCV RBC AUTO: 88.3 FL — SIGNIFICANT CHANGE UP (ref 80–100)
NRBC # FLD: 0 — SIGNIFICANT CHANGE UP
PLATELET # BLD AUTO: 243 K/UL — SIGNIFICANT CHANGE UP (ref 150–400)
PMV BLD: 11.1 FL — SIGNIFICANT CHANGE UP (ref 7–13)
POTASSIUM SERPL-MCNC: 3.5 MMOL/L — SIGNIFICANT CHANGE UP (ref 3.5–5.3)
POTASSIUM SERPL-SCNC: 3.5 MMOL/L — SIGNIFICANT CHANGE UP (ref 3.5–5.3)
RBC # BLD: 3.34 M/UL — LOW (ref 3.8–5.2)
RBC # FLD: 12.4 % — SIGNIFICANT CHANGE UP (ref 10.3–14.5)
SODIUM SERPL-SCNC: 140 MMOL/L — SIGNIFICANT CHANGE UP (ref 135–145)
WBC # BLD: 7.42 K/UL — SIGNIFICANT CHANGE UP (ref 3.8–10.5)
WBC # FLD AUTO: 7.42 K/UL — SIGNIFICANT CHANGE UP (ref 3.8–10.5)

## 2018-01-19 RX ORDER — POLYETHYLENE GLYCOL 3350 17 G/17G
17 POWDER, FOR SOLUTION ORAL
Qty: 0 | Refills: 0 | DISCHARGE
Start: 2018-01-19

## 2018-01-19 RX ORDER — SENNA PLUS 8.6 MG/1
2 TABLET ORAL
Qty: 0 | Refills: 0 | DISCHARGE
Start: 2018-01-19

## 2018-01-19 RX ADMIN — INFLUENZA VIRUS VACCINE 0.5 MILLILITER(S): 15; 15; 15; 15 SUSPENSION INTRAMUSCULAR at 17:31

## 2018-01-19 RX ADMIN — OXYCODONE AND ACETAMINOPHEN 1 TABLET(S): 5; 325 TABLET ORAL at 11:42

## 2018-01-19 RX ADMIN — Medication 100 MILLIGRAM(S): at 05:25

## 2018-01-19 RX ADMIN — Medication 1 TABLET(S): at 15:50

## 2018-01-19 RX ADMIN — POLYETHYLENE GLYCOL 3350 17 GRAM(S): 17 POWDER, FOR SOLUTION ORAL at 15:01

## 2018-01-19 RX ADMIN — OXYCODONE AND ACETAMINOPHEN 1 TABLET(S): 5; 325 TABLET ORAL at 05:25

## 2018-01-19 RX ADMIN — SERTRALINE 100 MILLIGRAM(S): 25 TABLET, FILM COATED ORAL at 15:02

## 2018-01-19 RX ADMIN — OXYCODONE AND ACETAMINOPHEN 1 TABLET(S): 5; 325 TABLET ORAL at 11:12

## 2018-01-19 RX ADMIN — Medication 100 MILLIGRAM(S): at 15:01

## 2018-01-19 RX ADMIN — Medication 200 MILLIGRAM(S): at 05:27

## 2018-01-19 RX ADMIN — ONDANSETRON 4 MILLIGRAM(S): 8 TABLET, FILM COATED ORAL at 05:28

## 2018-01-19 RX ADMIN — Medication 100 MILLIGRAM(S): at 05:27

## 2018-01-19 NOTE — PROGRESS NOTE ADULT - ASSESSMENT
32yof with h/o PCOS, Depression, laminectomy, gastric sleeve, cholecystectomy came in c/o having left sided lower abdominal pain since yesterday morning, describes as constant, sharp 8/10 pain without any radiation, associated with nausea, 2 episodes of non bloody vomiting, chills and multiple episodes of loose stools. Per patient pain is worse with position change, better with applying pressure to the area, did not try any pain meds. No reports of any fever, cough, sick contact, sob, chest pain, dysuria, vaginal discharge, melena, BRBPR or any other complaints.    In the ED pt had pelvic sono which was neg, and ct a/p showed acute diverticulitis, was given 3 lits NS, 1 dose of cipro and flagyl, morphine and zofran and admitted to medicine for further management.     Problem/Plan - 1:  ·  Problem: Acute Sigmoid Diverticulitis.  Plan: IVF,IV Cipro and Flagyl . Will advance diet as tolerated.  Outpt Colonoscopy.      Problem/Plan - 2:  ·  Problem: Anemia, unspecified type.  Plan: Work Up negative so far. Outpt f/u      Problem/Plan - 3:  ·  Problem: Depression.  Plan: cont zoloft.
32yof with h/o PCOS, Depression, laminectomy, gastric sleeve, cholecystectomy came in c/o having left sided lower abdominal pain since yesterday morning, describes as constant, sharp 8/10 pain without any radiation, associated with nausea, 2 episodes of non bloody vomiting, chills and multiple episodes of loose stools. Per patient pain is worse with position change, better with applying pressure to the area, did not try any pain meds. No reports of any fever, cough, sick contact, sob, chest pain, dysuria, vaginal discharge, melena, BRBPR or any other complaints.    In the ED pt had pelvic sono which was neg, and ct a/p showed acute diverticulitis, was given 3 lits NS, 1 dose of cipro and flagyl, morphine and zofran and admitted to medicine for further management.     Problem/Plan - 1:  ·  Problem: Diverticulitis.  Plan: IVF,IV Cipro and Flagyl . Continue Clears as still having pain .      Problem/Plan - 2:  ·  Problem: Anemia, unspecified type.  Plan: Work Up negative so far.      Problem/Plan - 3:  ·  Problem: Depression.  Plan: cont zoloft.
32yof with h/o PCOS, Depression, laminectomy, gastric sleeve, cholecystectomy came in c/o having left sided lower abdominal pain since yesterday morning, describes as constant, sharp 8/10 pain without any radiation, associated with nausea, 2 episodes of non bloody vomiting, chills and multiple episodes of loose stools. Per patient pain is worse with position change, better with applying pressure to the area, did not try any pain meds. No reports of any fever, cough, sick contact, sob, chest pain, dysuria, vaginal discharge, melena, BRBPR or any other complaints.    In the ED pt had pelvic sono which was neg, and ct a/p showed acute diverticulitis, was given 3 lits NS, 1 dose of cipro and flagyl, morphine and zofran and admitted to medicine for further management.     Problem/Plan - 1:  ·  Problem: Diverticulitis.  Plan: IVF,IV Cipro and Flagyl dcd . Will start her on Augmentin BID for 1 week. Will advance Diet to Regular low fiber. Outpt GI follow up with possible Colonoscopy.      Problem/Plan - 2:  ·  Problem: Anemia, unspecified type.  Plan: Work Up negative so far. Out pt PCP f/u with [facility]     Problem/Plan - 3:  ·  Problem: Depression.  Plan: cont zoloft.
2yof with h/o PCOS, Depression, laminectomy, gastric sleeve, cholecystectomy a/w acute diverticulitis.     Problem/Plan - 1:  ·  Problem: Diverticulitis.  Plan: IVF,IV Cipro and Flagyl . Will change to Clear Liquid as having pain.      Problem/Plan - 2:  ·  Problem: Anemia, unspecified type.  Plan: check iron studies, monitor H&H.     Problem/Plan - 3:  ·  Problem: Depression.  Plan: cont zoloft.

## 2018-01-19 NOTE — PROGRESS NOTE ADULT - SUBJECTIVE AND OBJECTIVE BOX
INTERVAL HPI/OVERNIGHT EVENTS: I still have pain .   Vital Signs Last 24 Hrs  T(C): 36.6 (18 Jan 2018 13:14), Max: 36.8 (17 Jan 2018 17:13)  T(F): 97.8 (18 Jan 2018 13:14), Max: 98.2 (17 Jan 2018 17:13)  HR: 63 (18 Jan 2018 13:14) (55 - 63)  BP: 110/71 (18 Jan 2018 13:14) (99/67 - 113/81)  BP(mean): --  RR: 16 (18 Jan 2018 13:14) (16 - 18)  SpO2: 100% (18 Jan 2018 13:14) (100% - 100%)  I&O's Summary    MEDICATIONS  (STANDING):  ciprofloxacin   IVPB 400 milliGRAM(s) IV Intermittent every 12 hours  docusate sodium 100 milliGRAM(s) Oral three times a day  influenza   Vaccine 0.5 milliLiter(s) IntraMuscular once  metroNIDAZOLE  IVPB 500 milliGRAM(s) IV Intermittent every 8 hours  polyethylene glycol 3350 17 Gram(s) Oral daily  senna 2 Tablet(s) Oral at bedtime  sertraline 100 milliGRAM(s) Oral daily  sodium chloride 0.9%. 1000 milliLiter(s) (100 mL/Hr) IV Continuous <Continuous>    MEDICATIONS  (PRN):  morphine  - Injectable 4 milliGRAM(s) IV Push every 6 hours PRN Severe Pain (7 - 10)  ondansetron Injectable 4 milliGRAM(s) IV Push every 6 hours PRN Nausea and/or Vomiting  oxyCODONE    5 mG/acetaminophen 325 mG 1 Tablet(s) Oral every 6 hours PRN mild and moderate pain    LABS:                        10.3   7.20  )-----------( 253      ( 18 Jan 2018 05:00 )             30.6     01-18    139  |  105  |  2<L>  ----------------------------<  74  3.6   |  25  |  0.52    Ca    8.3<L>      18 Jan 2018 05:00          CAPILLARY BLOOD GLUCOSE              REVIEW OF SYSTEMS:  CONSTITUTIONAL: No fever, weight loss, or fatigue  EYES: No eye pain, visual disturbances, or discharge  ENMT:  No difficulty hearing, tinnitus, vertigo; No sinus or throat pain  NECK: No pain or stiffness  BREASTS: No pain, masses, or nipple discharge  RESPIRATORY: No cough, wheezing, chills or hemoptysis; No shortness of breath  CARDIOVASCULAR: No chest pain, palpitations, dizziness, or leg swelling  GASTROINTESTINAL: No abdominal or epigastric pain. No nausea, vomiting, or hematemesis; No diarrhea or constipation. No melena or hematochezia.  GENITOURINARY: No dysuria, frequency, hematuria, or incontinence  NEUROLOGICAL: No headaches, memory loss, loss of strength, numbness, or tremors  SKIN: No itching, burning, rashes, or lesions   LYMPH NODES: No enlarged glands  ENDOCRINE: No heat or cold intolerance; No hair loss  MUSCULOSKELETAL: No joint pain or swelling; No muscle, back, or extremity pain  PSYCHIATRIC: No depression, anxiety, mood swings, or difficulty sleeping  HEME/LYMPH: No easy bruising, or bleeding gums  ALLERY AND IMMUNOLOGIC: No hives or eczema    RADIOLOGY & ADDITIONAL TESTS:    Consultant(s) Notes Reviewed:  [x ] YES  [ ] NO    PHYSICAL EXAM:  GENERAL: NAD, well-groomed, well-developed,not in any distress ,  HEAD:  Atraumatic, Normocephalic  EYES: EOMI, PERRLA, conjunctiva and sclera clear  ENMT: No tonsillar erythema, exudates, or enlargement; Moist mucous membranes, Good dentition, No lesions  NECK: Supple, No JVD, Normal thyroid  NERVOUS SYSTEM:  Alert & Oriented X3, No focal deficit   CHEST/LUNG: Good air entry bilateral with no  rales, rhonchi, wheezing, or rubs  HEART: Regular rate and rhythm; No murmurs, rubs, or gallops  ABDOMEN: Soft, LLQ tenderness+, Nondistended; Bowel sounds present  EXTREMITIES:  2+ Peripheral Pulses, No clubbing, cyanosis, or edema  SKIN: No rashes or lesions    Care Discussed with Consultants/Other Providers [ x] YES  [ ] NO
INTERVAL HPI/OVERNIGHT EVENTS: Still have pain .   Vital Signs Last 24 Hrs  T(C): 36.8 (17 Jan 2018 17:13), Max: 36.8 (17 Jan 2018 17:13)  T(F): 98.2 (17 Jan 2018 17:13), Max: 98.2 (17 Jan 2018 17:13)  HR: 62 (17 Jan 2018 17:13) (51 - 69)  BP: 110/73 (17 Jan 2018 17:13) (106/64 - 117/78)  BP(mean): --  RR: 18 (17 Jan 2018 17:13) (17 - 18)  SpO2: 100% (17 Jan 2018 11:46) (96% - 100%)  I&O's Summary    16 Jan 2018 07:01  -  17 Jan 2018 07:00  --------------------------------------------------------  IN: 1150 mL / OUT: 600 mL / NET: 550 mL      MEDICATIONS  (STANDING):  ciprofloxacin   IVPB 400 milliGRAM(s) IV Intermittent every 12 hours  docusate sodium 100 milliGRAM(s) Oral three times a day  influenza   Vaccine 0.5 milliLiter(s) IntraMuscular once  metroNIDAZOLE  IVPB 500 milliGRAM(s) IV Intermittent every 8 hours  polyethylene glycol 3350 17 Gram(s) Oral daily  senna 2 Tablet(s) Oral at bedtime  sertraline 100 milliGRAM(s) Oral daily  sodium chloride 0.9%. 1000 milliLiter(s) (100 mL/Hr) IV Continuous <Continuous>    MEDICATIONS  (PRN):  morphine  - Injectable 4 milliGRAM(s) IV Push every 6 hours PRN Severe Pain (7 - 10)  ondansetron Injectable 4 milliGRAM(s) IV Push every 6 hours PRN Nausea and/or Vomiting  oxyCODONE    5 mG/acetaminophen 325 mG 1 Tablet(s) Oral every 6 hours PRN mild and moderate pain    LABS:                        9.6    8.87  )-----------( 249      ( 17 Jan 2018 06:00 )             28.9     01-17    140  |  108<H>  |  5<L>  ----------------------------<  77  4.1   |  24  |  0.52    Ca    8.2<L>      17 Jan 2018 06:00          CAPILLARY BLOOD GLUCOSE              REVIEW OF SYSTEMS:  CONSTITUTIONAL: No fever, weight loss, or fatigue  EYES: No eye pain, visual disturbances, or discharge  ENMT:  No difficulty hearing, tinnitus, vertigo; No sinus or throat pain  NECK: No pain or stiffness  BREASTS: No pain, masses, or nipple discharge  RESPIRATORY: No cough, wheezing, chills or hemoptysis; No shortness of breath  CARDIOVASCULAR: No chest pain, palpitations, dizziness, or leg swelling  GASTROINTESTINAL: No abdominal or epigastric pain. No nausea, vomiting, or hematemesis; No diarrhea or constipation. No melena or hematochezia.  GENITOURINARY: No dysuria, frequency, hematuria, or incontinence  NEUROLOGICAL: No headaches, memory loss, loss of strength, numbness, or tremors  SKIN: No itching, burning, rashes, or lesions   LYMPH NODES: No enlarged glands  ENDOCRINE: No heat or cold intolerance; No hair loss  MUSCULOSKELETAL: No joint pain or swelling; No muscle, back, or extremity pain  PSYCHIATRIC: No depression, anxiety, mood swings, or difficulty sleeping  HEME/LYMPH: No easy bruising, or bleeding gums  ALLERY AND IMMUNOLOGIC: No hives or eczema    RADIOLOGY & ADDITIONAL TESTS:    Consultant(s) Notes Reviewed:  [x ] YES  [ ] NO    PHYSICAL EXAM:  GENERAL: NAD, well-groomed, well-developed, not in any distress ,  HEAD:  Atraumatic, Normocephalic  EYES: EOMI, PERRLA, conjunctiva and sclera clear  ENMT: No tonsillar erythema, exudates, or enlargement; Moist mucous membranes, Good dentition, No lesions  NECK: Supple, No JVD, Normal thyroid  NERVOUS SYSTEM:  Alert & Oriented X3, No focal deficit   CHEST/LUNG: Good air entry bilateral with no  rales, rhonchi, wheezing, or rubs  HEART: Regular rate and rhythm; No murmurs, rubs, or gallops  ABDOMEN: Soft, , Nondistended; Bowel sounds present, RLLQ tenderness ++  EXTREMITIES:  2+ Peripheral Pulses, No clubbing, cyanosis, or edema  SKIN: No rashes or lesions    Care Discussed with Consultants/Other Providers [ x] YES  [ ] NO
INTERVAL HPI/OVERNIGHT EVENTS: I feel better except get nausea with Flagyl . Want something different . My Dog  and would like to go home.   Vital Signs Last 24 Hrs  T(C): 37.1 (2018 05:21), Max: 37.1 (2018 05:21)  T(F): 98.7 (2018 05:21), Max: 98.7 (2018 05:21)  HR: 72 (2018 05:21) (64 - 72)  BP: 110/70 (2018 05:21) (110/70 - 116/72)  BP(mean): --  RR: 18 (2018 05:21) (18 - 18)  SpO2: 100% (2018 05:21) (100% - 100%)  I&O's Summary    2018 07:01  -  2018 07:00  --------------------------------------------------------  IN: 400 mL / OUT: 0 mL / NET: 400 mL    2018 07:01  -  2018 14:58  --------------------------------------------------------  IN: 1200 mL / OUT: 0 mL / NET: 1200 mL      MEDICATIONS  (STANDING):  amoxicillin  875 milliGRAM(s)/clavulanate 1 Tablet(s) Oral two times a day  docusate sodium 100 milliGRAM(s) Oral three times a day  influenza   Vaccine 0.5 milliLiter(s) IntraMuscular once  polyethylene glycol 3350 17 Gram(s) Oral daily  senna 2 Tablet(s) Oral at bedtime  sertraline 100 milliGRAM(s) Oral daily  sodium chloride 0.9%. 1000 milliLiter(s) (100 mL/Hr) IV Continuous <Continuous>    MEDICATIONS  (PRN):  bisacodyl Suppository 10 milliGRAM(s) Rectal daily PRN Constipation  morphine  - Injectable 4 milliGRAM(s) IV Push every 6 hours PRN Severe Pain (7 - 10)  ondansetron Injectable 4 milliGRAM(s) IV Push every 6 hours PRN Nausea and/or Vomiting  oxyCODONE    5 mG/acetaminophen 325 mG 1 Tablet(s) Oral every 6 hours PRN mild and moderate pain    LABS:                        10.2   7.42  )-----------( 243      ( 2018 07:00 )             29.5     01-19    140  |  106  |  2<L>  ----------------------------<  98  3.5   |  24  |  0.51    Ca    8.1<L>      2018 07:00          CAPILLARY BLOOD GLUCOSE              REVIEW OF SYSTEMS:  CONSTITUTIONAL: No fever, weight loss, or fatigue  EYES: No eye pain, visual disturbances, or discharge  ENMT:  No difficulty hearing, tinnitus, vertigo; No sinus or throat pain  NECK: No pain or stiffness  BREASTS: No pain, masses, or nipple discharge  RESPIRATORY: No cough, wheezing, chills or hemoptysis; No shortness of breath  CARDIOVASCULAR: No chest pain, palpitations, dizziness, or leg swelling  GASTROINTESTINAL: No abdominal or epigastric pain. No nausea, vomiting, or hematemesis; No diarrhea or constipation. No melena or hematochezia.  GENITOURINARY: No dysuria, frequency, hematuria, or incontinence  NEUROLOGICAL: No headaches, memory loss, loss of strength, numbness, or tremors  SKIN: No itching, burning, rashes, or lesions   LYMPH NODES: No enlarged glands  ENDOCRINE: No heat or cold intolerance; No hair loss  MUSCULOSKELETAL: No joint pain or swelling; No muscle, back, or extremity pain  PSYCHIATRIC: No depression, anxiety, mood swings, or difficulty sleeping  HEME/LYMPH: No easy bruising, or bleeding gums  ALLERY AND IMMUNOLOGIC: No hives or eczema    RADIOLOGY & ADDITIONAL TESTS:    Consultant(s) Notes Reviewed:  [x ] YES  [ ] NO    PHYSICAL EXAM:  GENERAL: NAD, well-groomed, well-developed,not in any distress ,  HEAD:  Atraumatic, Normocephalic  EYES: EOMI, PERRLA, conjunctiva and sclera clear  ENMT: No tonsillar erythema, exudates, or enlargement; Moist mucous membranes, Good dentition, No lesions  NECK: Supple, No JVD, Normal thyroid  NERVOUS SYSTEM:  Alert & Oriented X3, No focal deficit   CHEST/LUNG: Good air entry bilateral with no  rales, rhonchi, wheezing, or rubs  HEART: Regular rate and rhythm; No murmurs, rubs, or gallops  ABDOMEN: Soft, Mild LLQ tenderness , Nondistended; Bowel sounds present  EXTREMITIES:  2+ Peripheral Pulses, No clubbing, cyanosis, or edema  SKIN: No rashes or lesions    Care Discussed with Consultants/Other Providers [ x] YES  [ ] NO
INTERVAL HPI/OVERNIGHT EVENTS: I still have pain .  Vital Signs Last 24 Hrs  T(C): 36.3 (2018 14:30), Max: 36.8 (15 Jose Juan 2018 21:36)  T(F): 97.4 (2018 14:30), Max: 98.2 (15 Jose Juan 2018 21:36)  HR: 53 (2018 14:30) (53 - 71)  BP: 113/76 (2018 14:30) (104/60 - 113/76)  BP(mean): --  RR: 18 (2018 14:30) (16 - 18)  SpO2: 100% (2018 14:30) (99% - 100%)  I&O's Summary    15 Jose Juan 2018 07:01  -  2018 07:00  --------------------------------------------------------  IN: 2800 mL / OUT: 0 mL / NET: 2800 mL      MEDICATIONS  (STANDING):  ciprofloxacin   IVPB 400 milliGRAM(s) IV Intermittent every 12 hours  influenza   Vaccine 0.5 milliLiter(s) IntraMuscular once  metroNIDAZOLE  IVPB 500 milliGRAM(s) IV Intermittent every 8 hours  sertraline 100 milliGRAM(s) Oral daily  sodium chloride 0.9%. 1000 milliLiter(s) (100 mL/Hr) IV Continuous <Continuous>    MEDICATIONS  (PRN):  morphine  - Injectable 4 milliGRAM(s) IV Push every 6 hours PRN Severe Pain (7 - 10)  ondansetron Injectable 4 milliGRAM(s) IV Push every 6 hours PRN Nausea and/or Vomiting  oxyCODONE    5 mG/acetaminophen 325 mG 1 Tablet(s) Oral every 6 hours PRN mild and moderate pain    LABS:                        9.5    8.95  )-----------( 254      ( 2018 06:01 )             29.9     -16    138  |  107  |  8   ----------------------------<  67<L>  3.9   |  24  |  0.53    Ca    7.9<L>      2018 06:01    TPro  6.7  /  Alb  3.9  /  TBili  0.2  /  DBili  x   /  AST  11  /  ALT  10  /  AlkPhos  60        Urinalysis Basic - ( 2018 22:52 )    Color: YELLOW / Appearance: CLEAR / S.032 / pH: 6.5  Gluc: NEGATIVE / Ketone: NEGATIVE  / Bili: NEGATIVE / Urobili: 1 mg/dL   Blood: NEGATIVE / Protein: 30 mg/dL / Nitrite: NEGATIVE   Leuk Esterase: NEGATIVE / RBC: 0-2 / WBC 0-2   Sq Epi: OCC / Non Sq Epi: x / Bacteria: x      CAPILLARY BLOOD GLUCOSE            Urinalysis Basic - ( 2018 22:52 )    Color: YELLOW / Appearance: CLEAR / S.032 / pH: 6.5  Gluc: NEGATIVE / Ketone: NEGATIVE  / Bili: NEGATIVE / Urobili: 1 mg/dL   Blood: NEGATIVE / Protein: 30 mg/dL / Nitrite: NEGATIVE   Leuk Esterase: NEGATIVE / RBC: 0-2 / WBC 0-2   Sq Epi: OCC / Non Sq Epi: x / Bacteria: x      REVIEW OF SYSTEMS:  CONSTITUTIONAL: No fever, weight loss, or fatigue  EYES: No eye pain, visual disturbances, or discharge  ENMT:  No difficulty hearing, tinnitus, vertigo; No sinus or throat pain  NECK: No pain or stiffness  BREASTS: No pain, masses, or nipple discharge  RESPIRATORY: No cough, wheezing, chills or hemoptysis; No shortness of breath  CARDIOVASCULAR: No chest pain, palpitations, dizziness, or leg swelling  GASTROINTESTINAL:abdominal pain . No nausea, vomiting, or hematemesis; No diarrhea or constipation. No melena or hematochezia.  GENITOURINARY: No dysuria, frequency, hematuria, or incontinence  NEUROLOGICAL: No headaches, memory loss, loss of strength, numbness, or tremors,   SKIN: No itching, burning, rashes, or lesions   LYMPH NODES: No enlarged glands  ENDOCRINE: No heat or cold intolerance; No hair loss  MUSCULOSKELETAL: No joint pain or swelling; No muscle, back, or extremity pain  PSYCHIATRIC: No depression, anxiety, mood swings, or difficulty sleeping  HEME/LYMPH: No easy bruising, or bleeding gums  ALLERY AND IMMUNOLOGIC: No hives or eczema    RADIOLOGY & ADDITIONAL TESTS:    Consultant(s) Notes Reviewed:  [x ] YES  [ ] NO    PHYSICAL EXAM:  GENERAL: NAD, well-groomed, well-developed,not in any distress ,  HEAD:  Atraumatic, Normocephalic  EYES: EOMI, PERRLA, conjunctiva and sclera clear  ENMT: No tonsillar erythema, exudates, or enlargement; Moist mucous membranes, Good dentition, No lesions  NECK: Supple, No JVD, Normal thyroid  NERVOUS SYSTEM:  Alert & Oriented X3, No focal deficit ,  CHEST/LUNG: Good air entry bilateral with no  rales, rhonchi, wheezing, or rubs  HEART: Regular rate and rhythm; No murmurs, rubs, or gallops  ABDOMEN: Soft, tenderness LLQ , Nondistended; Bowel sounds present  EXTREMITIES:  2+ Peripheral Pulses, No clubbing, cyanosis, or edema  SKIN: No rashes or lesions    Care Discussed with Consultants/Other Providers [ x] YES  [ ] NO

## 2018-12-30 ENCOUNTER — EMERGENCY (EMERGENCY)
Facility: HOSPITAL | Age: 33
LOS: 1 days | Discharge: HOME | End: 2018-12-30
Attending: EMERGENCY MEDICINE | Admitting: EMERGENCY MEDICINE
Payer: COMMERCIAL

## 2018-12-30 VITALS
SYSTOLIC BLOOD PRESSURE: 116 MMHG | HEART RATE: 58 BPM | RESPIRATION RATE: 16 BRPM | OXYGEN SATURATION: 100 % | DIASTOLIC BLOOD PRESSURE: 69 MMHG

## 2018-12-30 VITALS
RESPIRATION RATE: 18 BRPM | OXYGEN SATURATION: 100 % | DIASTOLIC BLOOD PRESSURE: 79 MMHG | HEART RATE: 60 BPM | SYSTOLIC BLOOD PRESSURE: 113 MMHG | TEMPERATURE: 99 F

## 2018-12-30 DIAGNOSIS — Z98.890 OTHER SPECIFIED POSTPROCEDURAL STATES: Chronic | ICD-10-CM

## 2018-12-30 DIAGNOSIS — Z90.49 ACQUIRED ABSENCE OF OTHER SPECIFIED PARTS OF DIGESTIVE TRACT: Chronic | ICD-10-CM

## 2018-12-30 DIAGNOSIS — Z98.84 BARIATRIC SURGERY STATUS: Chronic | ICD-10-CM

## 2018-12-30 DIAGNOSIS — F32.9 MAJOR DEPRESSIVE DISORDER, SINGLE EPISODE, UNSPECIFIED: Chronic | ICD-10-CM

## 2018-12-30 LAB
APPEARANCE UR: CLEAR — SIGNIFICANT CHANGE UP
BASOPHILS # BLD AUTO: 0.05 K/UL — SIGNIFICANT CHANGE UP (ref 0–0.2)
BASOPHILS NFR BLD AUTO: 0.5 % — SIGNIFICANT CHANGE UP (ref 0–2)
BILIRUB UR-MCNC: NEGATIVE — SIGNIFICANT CHANGE UP
BLD GP AB SCN SERPL QL: NEGATIVE — SIGNIFICANT CHANGE UP
BLOOD UR QL VISUAL: NEGATIVE — SIGNIFICANT CHANGE UP
COLOR SPEC: YELLOW — SIGNIFICANT CHANGE UP
EOSINOPHIL # BLD AUTO: 0.24 K/UL — SIGNIFICANT CHANGE UP (ref 0–0.5)
EOSINOPHIL NFR BLD AUTO: 2.4 % — SIGNIFICANT CHANGE UP (ref 0–6)
GLUCOSE UR-MCNC: NEGATIVE — SIGNIFICANT CHANGE UP
HCG SERPL-ACNC: 9733 MIU/ML — SIGNIFICANT CHANGE UP
HCT VFR BLD CALC: 36.5 % — SIGNIFICANT CHANGE UP (ref 34.5–45)
HGB BLD-MCNC: 11.5 G/DL — SIGNIFICANT CHANGE UP (ref 11.5–15.5)
IMM GRANULOCYTES # BLD AUTO: 0.04 # — SIGNIFICANT CHANGE UP
IMM GRANULOCYTES NFR BLD AUTO: 0.4 % — SIGNIFICANT CHANGE UP (ref 0–1.5)
KETONES UR-MCNC: NEGATIVE — SIGNIFICANT CHANGE UP
LEUKOCYTE ESTERASE UR-ACNC: NEGATIVE — SIGNIFICANT CHANGE UP
LYMPHOCYTES # BLD AUTO: 4.11 K/UL — HIGH (ref 1–3.3)
LYMPHOCYTES # BLD AUTO: 41.9 % — SIGNIFICANT CHANGE UP (ref 13–44)
MCHC RBC-ENTMCNC: 28.5 PG — SIGNIFICANT CHANGE UP (ref 27–34)
MCHC RBC-ENTMCNC: 31.5 % — LOW (ref 32–36)
MCV RBC AUTO: 90.3 FL — SIGNIFICANT CHANGE UP (ref 80–100)
MONOCYTES # BLD AUTO: 0.97 K/UL — HIGH (ref 0–0.9)
MONOCYTES NFR BLD AUTO: 9.9 % — SIGNIFICANT CHANGE UP (ref 2–14)
NEUTROPHILS # BLD AUTO: 4.41 K/UL — SIGNIFICANT CHANGE UP (ref 1.8–7.4)
NEUTROPHILS NFR BLD AUTO: 44.9 % — SIGNIFICANT CHANGE UP (ref 43–77)
NITRITE UR-MCNC: NEGATIVE — SIGNIFICANT CHANGE UP
NRBC # FLD: 0 — SIGNIFICANT CHANGE UP
PH UR: 6 — SIGNIFICANT CHANGE UP (ref 5–8)
PLATELET # BLD AUTO: 297 K/UL — SIGNIFICANT CHANGE UP (ref 150–400)
PMV BLD: 10.1 FL — SIGNIFICANT CHANGE UP (ref 7–13)
PROT UR-MCNC: NEGATIVE — SIGNIFICANT CHANGE UP
RBC # BLD: 4.04 M/UL — SIGNIFICANT CHANGE UP (ref 3.8–5.2)
RBC # FLD: 13 % — SIGNIFICANT CHANGE UP (ref 10.3–14.5)
RBC CASTS # UR COMP ASSIST: SIGNIFICANT CHANGE UP (ref 0–?)
RH IG SCN BLD-IMP: POSITIVE — SIGNIFICANT CHANGE UP
SP GR SPEC: 1.03 — SIGNIFICANT CHANGE UP (ref 1–1.04)
UROBILINOGEN FLD QL: NORMAL — SIGNIFICANT CHANGE UP
WBC # BLD: 9.82 K/UL — SIGNIFICANT CHANGE UP (ref 3.8–10.5)
WBC # FLD AUTO: 9.82 K/UL — SIGNIFICANT CHANGE UP (ref 3.8–10.5)
WBC UR QL: SIGNIFICANT CHANGE UP (ref 0–?)

## 2018-12-30 PROCEDURE — 76830 TRANSVAGINAL US NON-OB: CPT | Mod: 26

## 2018-12-30 PROCEDURE — 99284 EMERGENCY DEPT VISIT MOD MDM: CPT | Mod: 25

## 2018-12-30 NOTE — ED PROVIDER NOTE - MEDICAL DECISION MAKING DETAILS
32 y/o F at 9 weeks gestation with vaginal spotting tonight.  Will check h/h, rh, beta, tvus, ua/cx, reassess.  Likely threatened ab.

## 2018-12-30 NOTE — ED PROVIDER NOTE - NSFOLLOWUPINSTRUCTIONS_ED_ALL_ED_FT
Drink plenty of fluids.  You can take ibuprofen 600mg every 6 hours or Tylenol 650mg every 4 hours as needed for pain or fever.  Follow-up with your primary OB in 24-48 hours.  Return to the emergency department for any new or worsening symptoms, including worsening pain not relieved with over-the-counter medications, heavy vaginal bleeding where you are soaking 1 pad every hour for 2 or more hours in a row, or any other concerning symptoms.

## 2018-12-30 NOTE — ED PROVIDER NOTE - OBJECTIVE STATEMENT
32 y/o F G4A1P2 at 9 weeks gestation (LMP Oct 25th) here with vaginal spotting tonight.  Pt has a confirmed IUP.  Accompanied by mild lower abd tightness.  No fever, n/v, dysuria.

## 2018-12-30 NOTE — ED PROVIDER NOTE - NONTENDER LOCATION
right upper quadrant/left upper quadrant/left lower quadrant/umbilical/right lower quadrant/periumbilical/suprapubic

## 2018-12-30 NOTE — ED PROVIDER NOTE - PROGRESS NOTE DETAILS
Mohsen PARSONS: Pt stable, no complaints.  US showing no cardiac activity.  Pt reports she will follow-up with her OB.  Return precautions provided.  Pt is stable for discharge.

## 2018-12-30 NOTE — ED ADULT NURSE NOTE - OBJECTIVE STATEMENT
pt A&o x3 c/o vaginal bleeding since tonight. pt states it is a mix of dark and bright red blood, endorsing some cramping but denies any now. no reports of discharge, chest pain, sob or lightheadedness. pt is 9 weeks pregnant . vss. right ac 20g placed. nad noted. will monitor

## 2018-12-30 NOTE — ED ADULT NURSE NOTE - NSIMPLEMENTINTERV_GEN_ALL_ED
Implemented All Universal Safety Interventions:  [place] to call system. Call bell, personal items and telephone within reach. Instruct patient to call for assistance. Room bathroom lighting operational. Non-slip footwear when patient is off stretcher. Physically safe environment: no spills, clutter or unnecessary equipment. Stretcher in lowest position, wheels locked, appropriate side rails in place.

## 2018-12-31 LAB
BACTERIA UR CULT: SIGNIFICANT CHANGE UP
SPECIMEN SOURCE: SIGNIFICANT CHANGE UP

## 2019-01-01 ENCOUNTER — TRANSCRIPTION ENCOUNTER (OUTPATIENT)
Age: 34
End: 2019-01-01

## 2019-01-02 ENCOUNTER — RESULT REVIEW (OUTPATIENT)
Age: 34
End: 2019-01-02

## 2019-01-02 ENCOUNTER — OUTPATIENT (OUTPATIENT)
Dept: OUTPATIENT SERVICES | Facility: HOSPITAL | Age: 34
LOS: 1 days | End: 2019-01-02
Payer: COMMERCIAL

## 2019-01-02 DIAGNOSIS — F32.9 MAJOR DEPRESSIVE DISORDER, SINGLE EPISODE, UNSPECIFIED: Chronic | ICD-10-CM

## 2019-01-02 DIAGNOSIS — Z98.890 OTHER SPECIFIED POSTPROCEDURAL STATES: Chronic | ICD-10-CM

## 2019-01-02 DIAGNOSIS — Z98.84 BARIATRIC SURGERY STATUS: Chronic | ICD-10-CM

## 2019-01-02 DIAGNOSIS — Z98.891 HISTORY OF UTERINE SCAR FROM PREVIOUS SURGERY: Chronic | ICD-10-CM

## 2019-01-02 DIAGNOSIS — Z90.49 ACQUIRED ABSENCE OF OTHER SPECIFIED PARTS OF DIGESTIVE TRACT: Chronic | ICD-10-CM

## 2019-01-02 PROCEDURE — 93010 ELECTROCARDIOGRAM REPORT: CPT

## 2019-01-02 PROCEDURE — 88305 TISSUE EXAM BY PATHOLOGIST: CPT

## 2019-01-02 PROCEDURE — 88305 TISSUE EXAM BY PATHOLOGIST: CPT | Mod: 26

## 2019-01-02 PROCEDURE — 85610 PROTHROMBIN TIME: CPT

## 2019-01-02 PROCEDURE — 59820 CARE OF MISCARRIAGE: CPT

## 2019-01-02 PROCEDURE — 86901 BLOOD TYPING SEROLOGIC RH(D): CPT

## 2019-01-02 PROCEDURE — 86850 RBC ANTIBODY SCREEN: CPT

## 2019-01-02 PROCEDURE — 85027 COMPLETE CBC AUTOMATED: CPT

## 2019-01-02 PROCEDURE — 80053 COMPREHEN METABOLIC PANEL: CPT

## 2019-01-02 PROCEDURE — 99285 EMERGENCY DEPT VISIT HI MDM: CPT | Mod: 25

## 2019-01-02 PROCEDURE — 85730 THROMBOPLASTIN TIME PARTIAL: CPT

## 2019-01-02 PROCEDURE — 93005 ELECTROCARDIOGRAM TRACING: CPT

## 2019-01-02 PROCEDURE — 86900 BLOOD TYPING SEROLOGIC ABO: CPT

## 2019-01-02 PROCEDURE — 84702 CHORIONIC GONADOTROPIN TEST: CPT

## 2019-01-02 PROCEDURE — 99285 EMERGENCY DEPT VISIT HI MDM: CPT

## 2019-01-02 PROCEDURE — 36415 COLL VENOUS BLD VENIPUNCTURE: CPT

## 2019-01-02 PROCEDURE — 76817 TRANSVAGINAL US OBSTETRIC: CPT

## 2019-01-04 DIAGNOSIS — O20.8 OTHER HEMORRHAGE IN EARLY PREGNANCY: ICD-10-CM

## 2020-04-21 PROBLEM — F41.9 ANXIETY DISORDER, UNSPECIFIED: Chronic | Status: ACTIVE | Noted: 2019-01-02

## 2020-04-21 PROBLEM — R11.2 NAUSEA WITH VOMITING, UNSPECIFIED: Chronic | Status: ACTIVE | Noted: 2019-01-02

## 2020-04-24 ENCOUNTER — ASOB RESULT (OUTPATIENT)
Age: 35
End: 2020-04-24

## 2020-04-24 ENCOUNTER — APPOINTMENT (OUTPATIENT)
Dept: ANTEPARTUM | Facility: CLINIC | Age: 35
End: 2020-04-24
Payer: COMMERCIAL

## 2020-04-24 PROCEDURE — 36416 COLLJ CAPILLARY BLOOD SPEC: CPT

## 2020-04-24 PROCEDURE — 76801 OB US < 14 WKS SINGLE FETUS: CPT

## 2020-04-24 PROCEDURE — 76813 OB US NUCHAL MEAS 1 GEST: CPT

## 2020-06-13 ENCOUNTER — APPOINTMENT (OUTPATIENT)
Dept: ANTEPARTUM | Facility: CLINIC | Age: 35
End: 2020-06-13
Payer: COMMERCIAL

## 2020-06-13 ENCOUNTER — ASOB RESULT (OUTPATIENT)
Age: 35
End: 2020-06-13

## 2020-06-13 PROCEDURE — 76811 OB US DETAILED SNGL FETUS: CPT

## 2020-07-31 ENCOUNTER — OUTPATIENT (OUTPATIENT)
Dept: OUTPATIENT SERVICES | Facility: HOSPITAL | Age: 35
LOS: 1 days | Discharge: HOME | End: 2020-07-31

## 2020-07-31 DIAGNOSIS — Z98.890 OTHER SPECIFIED POSTPROCEDURAL STATES: Chronic | ICD-10-CM

## 2020-07-31 DIAGNOSIS — Z98.891 HISTORY OF UTERINE SCAR FROM PREVIOUS SURGERY: Chronic | ICD-10-CM

## 2020-07-31 DIAGNOSIS — Z90.49 ACQUIRED ABSENCE OF OTHER SPECIFIED PARTS OF DIGESTIVE TRACT: Chronic | ICD-10-CM

## 2020-07-31 DIAGNOSIS — Z98.84 BARIATRIC SURGERY STATUS: Chronic | ICD-10-CM

## 2020-07-31 DIAGNOSIS — F32.9 MAJOR DEPRESSIVE DISORDER, SINGLE EPISODE, UNSPECIFIED: Chronic | ICD-10-CM

## 2020-07-31 DIAGNOSIS — D64.9 ANEMIA, UNSPECIFIED: ICD-10-CM

## 2020-08-06 ENCOUNTER — RESULT REVIEW (OUTPATIENT)
Age: 35
End: 2020-08-06

## 2020-08-06 ENCOUNTER — APPOINTMENT (OUTPATIENT)
Dept: HEMATOLOGY ONCOLOGY | Facility: CLINIC | Age: 35
End: 2020-08-06
Payer: COMMERCIAL

## 2020-08-06 VITALS
OXYGEN SATURATION: 97 % | WEIGHT: 217.57 LBS | HEART RATE: 78 BPM | SYSTOLIC BLOOD PRESSURE: 91 MMHG | DIASTOLIC BLOOD PRESSURE: 62 MMHG | BODY MASS INDEX: 36.69 KG/M2 | RESPIRATION RATE: 16 BRPM | HEIGHT: 64.57 IN | TEMPERATURE: 98.5 F

## 2020-08-06 DIAGNOSIS — Z83.3 FAMILY HISTORY OF DIABETES MELLITUS: ICD-10-CM

## 2020-08-06 DIAGNOSIS — Z82.49 FAMILY HISTORY OF ISCHEMIC HEART DISEASE AND OTHER DISEASES OF THE CIRCULATORY SYSTEM: ICD-10-CM

## 2020-08-06 DIAGNOSIS — Z80.8 FAMILY HISTORY OF MALIGNANT NEOPLASM OF OTHER ORGANS OR SYSTEMS: ICD-10-CM

## 2020-08-06 DIAGNOSIS — E55.9 VITAMIN D DEFICIENCY, UNSPECIFIED: ICD-10-CM

## 2020-08-06 LAB
BASOPHILS # BLD AUTO: 0.06 K/UL — SIGNIFICANT CHANGE UP (ref 0–0.2)
BASOPHILS NFR BLD AUTO: 0.5 % — SIGNIFICANT CHANGE UP (ref 0–2)
EOSINOPHIL # BLD AUTO: 0.15 K/UL — SIGNIFICANT CHANGE UP (ref 0–0.5)
EOSINOPHIL NFR BLD AUTO: 1.2 % — SIGNIFICANT CHANGE UP (ref 0–6)
FERRITIN SERPL-MCNC: 8 NG/ML
FOLATE SERPL-MCNC: 18.5 NG/ML
HCT VFR BLD CALC: 28.5 % — LOW (ref 34.5–45)
HGB BLD-MCNC: 9.3 G/DL — LOW (ref 11.5–15.5)
IMM GRANULOCYTES NFR BLD AUTO: 2.7 % — HIGH (ref 0–1.5)
IRON SATN MFR SERPL: 6 %
IRON SERPL-MCNC: 29 UG/DL
LYMPHOCYTES # BLD AUTO: 2.92 K/UL — SIGNIFICANT CHANGE UP (ref 1–3.3)
LYMPHOCYTES # BLD AUTO: 22.9 % — SIGNIFICANT CHANGE UP (ref 13–44)
MCHC RBC-ENTMCNC: 28.3 PG — SIGNIFICANT CHANGE UP (ref 27–34)
MCHC RBC-ENTMCNC: 32.6 GM/DL — SIGNIFICANT CHANGE UP (ref 32–36)
MCV RBC AUTO: 86.6 FL — SIGNIFICANT CHANGE UP (ref 80–100)
MONOCYTES # BLD AUTO: 1.2 K/UL — HIGH (ref 0–0.9)
MONOCYTES NFR BLD AUTO: 9.4 % — SIGNIFICANT CHANGE UP (ref 2–14)
NEUTROPHILS # BLD AUTO: 8.06 K/UL — HIGH (ref 1.8–7.4)
NEUTROPHILS NFR BLD AUTO: 63.3 % — SIGNIFICANT CHANGE UP (ref 43–77)
NRBC # BLD: 0 /100 WBCS — SIGNIFICANT CHANGE UP (ref 0–0)
PLATELET # BLD AUTO: 262 K/UL — SIGNIFICANT CHANGE UP (ref 150–400)
RBC # BLD: 3.29 M/UL — LOW (ref 3.8–5.2)
RBC # FLD: 13.5 % — SIGNIFICANT CHANGE UP (ref 10.3–14.5)
SARS-COV-2 RNA SPEC QL NAA+PROBE: SIGNIFICANT CHANGE UP
TIBC SERPL-MCNC: 452 UG/DL
UIBC SERPL-MCNC: 423 UG/DL
VIT B12 SERPL-MCNC: 231 PG/ML
WBC # BLD: 12.73 K/UL — HIGH (ref 3.8–10.5)
WBC # FLD AUTO: 12.73 K/UL — HIGH (ref 3.8–10.5)

## 2020-08-06 PROCEDURE — 99205 OFFICE O/P NEW HI 60 MIN: CPT

## 2020-08-07 PROBLEM — Z80.8 FAMILY HISTORY OF MALIGNANT NEOPLASM OF BRAIN: Status: ACTIVE | Noted: 2020-08-07

## 2020-08-07 PROBLEM — Z83.3 FAMILY HISTORY OF DIABETES MELLITUS: Status: ACTIVE | Noted: 2020-08-07

## 2020-08-07 PROBLEM — E55.9 VITAMIN D DEFICIENCY: Status: ACTIVE | Noted: 2020-08-07

## 2020-08-07 PROBLEM — Z80.8 FAMILY HISTORY OF MALIGNANT NEOPLASM OF THYROID: Status: ACTIVE | Noted: 2020-08-07

## 2020-08-07 PROBLEM — Z82.49 FAMILY HISTORY OF HYPERTENSION: Status: ACTIVE | Noted: 2020-08-07

## 2020-08-07 RX ORDER — SERTRALINE HYDROCHLORIDE 50 MG/1
50 TABLET, FILM COATED ORAL
Refills: 0 | Status: ACTIVE | COMMUNITY

## 2020-08-07 RX ORDER — CHROMIUM 200 MCG
1000 TABLET ORAL
Refills: 0 | Status: ACTIVE | COMMUNITY

## 2020-08-07 RX ORDER — DOCUSATE SODIUM 100 MG/1
100 CAPSULE ORAL
Refills: 0 | Status: ACTIVE | COMMUNITY

## 2020-08-07 NOTE — HISTORY OF PRESENT ILLNESS
[Disease:__________________________] : Disease: [unfilled] [de-identified] : 33 yo female with hx gastric sleeve in 2014, s/p cholecystectomy  and abdominal hernia repair s/p  x2, spinal laminectomy in 2016, now  @ 28 weeks gestation (2 live births, 2 miscarriages), referred by Ob/gyn for iron deficiency anemia. Pt reports hx of fatigue and constipation. She was not taking prenatal vitamins due to severe nausea with pills. Also reports ongoing nausea with Vit D tabs. Pt was noted on labs to have Hb 9.3g in 2020. Given ongoing complaints, pt was referred to hematology for consideration of IV iron infusion. Pt denies need for prior IV iron. She denies any hx of prbc transfusion. She denies any palpitations or active bleeding. \par LMP 2020\par CASSIE 10/27/2020\par \par Ob/Gyn - Adolfo [phone], [address], [address] [facility], [place] [address] [de-identified] : moderate [FreeTextEntry1] : iron

## 2020-08-07 NOTE — PHYSICAL EXAM
[Restricted in physically strenuous activity but ambulatory and able to carry out work of a light or sedentary nature] : Status 1- Restricted in physically strenuous activity but ambulatory and able to carry out work of a light or sedentary nature, e.g., light house work, office work [Normal] : normal spine exam without palpable tenderness, no kyphosis or scoliosis [de-identified] : gravid abdomen +FM

## 2020-08-07 NOTE — REVIEW OF SYSTEMS
[Recent Change In Weight] : ~T recent weight change [Fatigue] : fatigue [Constipation] : constipation [Negative] : Allergic/Immunologic [FreeTextEntry7] : nausea

## 2020-08-10 ENCOUNTER — APPOINTMENT (OUTPATIENT)
Dept: INFUSION THERAPY | Facility: HOSPITAL | Age: 35
End: 2020-08-10

## 2020-08-11 DIAGNOSIS — D50.9 IRON DEFICIENCY ANEMIA, UNSPECIFIED: ICD-10-CM

## 2020-08-13 ENCOUNTER — APPOINTMENT (OUTPATIENT)
Dept: INFUSION THERAPY | Facility: HOSPITAL | Age: 35
End: 2020-08-13

## 2020-08-17 ENCOUNTER — OUTPATIENT (OUTPATIENT)
Dept: INPATIENT UNIT | Facility: HOSPITAL | Age: 35
LOS: 1 days | Discharge: HOME | End: 2020-08-17
Payer: COMMERCIAL

## 2020-08-17 ENCOUNTER — APPOINTMENT (OUTPATIENT)
Dept: INFUSION THERAPY | Facility: HOSPITAL | Age: 35
End: 2020-08-17

## 2020-08-17 VITALS
TEMPERATURE: 99 F | RESPIRATION RATE: 18 BRPM | SYSTOLIC BLOOD PRESSURE: 95 MMHG | HEART RATE: 71 BPM | DIASTOLIC BLOOD PRESSURE: 50 MMHG

## 2020-08-17 VITALS — HEART RATE: 82 BPM | SYSTOLIC BLOOD PRESSURE: 111 MMHG | DIASTOLIC BLOOD PRESSURE: 65 MMHG

## 2020-08-17 DIAGNOSIS — O26.899 OTHER SPECIFIED PREGNANCY RELATED CONDITIONS, UNSPECIFIED TRIMESTER: ICD-10-CM

## 2020-08-17 DIAGNOSIS — Z3A.00 WEEKS OF GESTATION OF PREGNANCY NOT SPECIFIED: ICD-10-CM

## 2020-08-17 DIAGNOSIS — Z90.49 ACQUIRED ABSENCE OF OTHER SPECIFIED PARTS OF DIGESTIVE TRACT: Chronic | ICD-10-CM

## 2020-08-17 DIAGNOSIS — Z98.891 HISTORY OF UTERINE SCAR FROM PREVIOUS SURGERY: Chronic | ICD-10-CM

## 2020-08-17 DIAGNOSIS — Z98.890 OTHER SPECIFIED POSTPROCEDURAL STATES: Chronic | ICD-10-CM

## 2020-08-17 DIAGNOSIS — Z98.84 BARIATRIC SURGERY STATUS: Chronic | ICD-10-CM

## 2020-08-17 DIAGNOSIS — F32.9 MAJOR DEPRESSIVE DISORDER, SINGLE EPISODE, UNSPECIFIED: Chronic | ICD-10-CM

## 2020-08-17 LAB
APPEARANCE UR: CLEAR — SIGNIFICANT CHANGE UP
BASOPHILS # BLD AUTO: 0.1 K/UL — SIGNIFICANT CHANGE UP (ref 0–0.2)
BASOPHILS NFR BLD AUTO: 0.7 % — SIGNIFICANT CHANGE UP (ref 0–2)
BILIRUB UR-MCNC: NEGATIVE — SIGNIFICANT CHANGE UP
BLOOD UR QL VISUAL: NEGATIVE — SIGNIFICANT CHANGE UP
COLOR SPEC: SIGNIFICANT CHANGE UP
EOSINOPHIL # BLD AUTO: 0.2 K/UL — SIGNIFICANT CHANGE UP (ref 0–0.5)
EOSINOPHIL NFR BLD AUTO: 1.4 % — SIGNIFICANT CHANGE UP (ref 0–6)
GLUCOSE UR-MCNC: NEGATIVE — SIGNIFICANT CHANGE UP
HCT VFR BLD CALC: 28.5 % — LOW (ref 34.5–45)
HGB BLD-MCNC: 8.7 G/DL — LOW (ref 11.5–15.5)
IMM GRANULOCYTES NFR BLD AUTO: 5.4 % — HIGH (ref 0–1.5)
KETONES UR-MCNC: NEGATIVE — SIGNIFICANT CHANGE UP
LEUKOCYTE ESTERASE UR-ACNC: NEGATIVE — SIGNIFICANT CHANGE UP
LYMPHOCYTES # BLD AUTO: 22.4 % — SIGNIFICANT CHANGE UP (ref 13–44)
LYMPHOCYTES # BLD AUTO: 3.13 K/UL — SIGNIFICANT CHANGE UP (ref 1–3.3)
MCHC RBC-ENTMCNC: 27.4 PG — SIGNIFICANT CHANGE UP (ref 27–34)
MCHC RBC-ENTMCNC: 30.5 % — LOW (ref 32–36)
MCV RBC AUTO: 89.9 FL — SIGNIFICANT CHANGE UP (ref 80–100)
MONOCYTES # BLD AUTO: 1.39 K/UL — HIGH (ref 0–0.9)
MONOCYTES NFR BLD AUTO: 9.9 % — SIGNIFICANT CHANGE UP (ref 2–14)
NEUTROPHILS # BLD AUTO: 8.42 K/UL — HIGH (ref 1.8–7.4)
NEUTROPHILS NFR BLD AUTO: 60.2 % — SIGNIFICANT CHANGE UP (ref 43–77)
NITRITE UR-MCNC: NEGATIVE — SIGNIFICANT CHANGE UP
NRBC # FLD: 0 K/UL — SIGNIFICANT CHANGE UP (ref 0–0)
PH UR: 7 — SIGNIFICANT CHANGE UP (ref 5–8)
PLATELET # BLD AUTO: 262 K/UL — SIGNIFICANT CHANGE UP (ref 150–400)
PMV BLD: 10.6 FL — SIGNIFICANT CHANGE UP (ref 7–13)
PROT UR-MCNC: NEGATIVE — SIGNIFICANT CHANGE UP
RBC # BLD: 3.17 M/UL — LOW (ref 3.8–5.2)
RBC # FLD: 14.2 % — SIGNIFICANT CHANGE UP (ref 10.3–14.5)
SP GR SPEC: 1.01 — SIGNIFICANT CHANGE UP (ref 1–1.04)
UROBILINOGEN FLD QL: NORMAL — SIGNIFICANT CHANGE UP
WBC # BLD: 13.99 K/UL — HIGH (ref 3.8–10.5)
WBC # FLD AUTO: 13.99 K/UL — HIGH (ref 3.8–10.5)

## 2020-08-17 PROCEDURE — 76816 OB US FOLLOW-UP PER FETUS: CPT | Mod: 26

## 2020-08-17 PROCEDURE — 59025 FETAL NON-STRESS TEST: CPT | Mod: 26

## 2020-08-17 PROCEDURE — 76817 TRANSVAGINAL US OBSTETRIC: CPT | Mod: 26

## 2020-08-17 PROCEDURE — 99213 OFFICE O/P EST LOW 20 MIN: CPT | Mod: 25

## 2020-08-17 RX ORDER — CITRIC ACID/SODIUM CITRATE 300-500 MG
30 SOLUTION, ORAL ORAL ONCE
Refills: 0 | Status: COMPLETED | OUTPATIENT
Start: 2020-08-17 | End: 2020-08-17

## 2020-08-17 RX ORDER — ACETAMINOPHEN 500 MG
975 TABLET ORAL ONCE
Refills: 0 | Status: COMPLETED | OUTPATIENT
Start: 2020-08-17 | End: 2020-08-17

## 2020-08-17 RX ADMIN — Medication 975 MILLIGRAM(S): at 17:24

## 2020-08-17 RX ADMIN — Medication 30 MILLILITER(S): at 18:26

## 2020-08-17 RX ADMIN — Medication 975 MILLIGRAM(S): at 18:24

## 2020-08-17 NOTE — OB PROVIDER TRIAGE NOTE - NSHPLABSRESULTS_GEN_ALL_CORE
8.7    13.99 )-----------( 262      ( 17 Aug 2020 17:20 )             28.5     Urinalysis Basic - ( 17 Aug 2020 17:20 )    Color: LIGHT YELLOW / Appearance: CLEAR / S.009 / pH: 7.0  Gluc: NEGATIVE / Ketone: NEGATIVE  / Bili: NEGATIVE / Urobili: NORMAL   Blood: NEGATIVE / Protein: NEGATIVE / Nitrite: NEGATIVE   Leuk Esterase: NEGATIVE / RBC: x / WBC x   Sq Epi: x / Non Sq Epi: x / Bacteria: x

## 2020-08-17 NOTE — OB PROVIDER TRIAGE NOTE - NSPRIMARYCAREPROV_OBGYN_ALL_OB
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  Admitted with SIRS possibly 2/2 ?pancreatitis vs choledocholithiasis and acute blood loss anemia likely from PUD vs varices MD//MIRZA/GALEN

## 2020-08-17 NOTE — OB PROVIDER TRIAGE NOTE - PSH
delivery delivered  x 2  12 m 9-11, repeat 13 10-1 m gdm insulin  H/O laminectomy  L4-L5    S/P cholecystectomy  2014  S/P gastric bypass  2014 gastric sleeve  S/P hernia repair  2014

## 2020-08-17 NOTE — OB PROVIDER TRIAGE NOTE - NSHPPHYSICALEXAM_GEN_ALL_CORE
speculum exam - cervix appears closed  TVUS - CL 4.9-5.1cm, no funneling or dynamic changes noted  TAS - cephalic presentation, bpp 8/8, you 19cm, posterior placenta, efw 2743 grams  abdomen soft and nontender  no CVA tenderness noted    Vital Signs Last 24 Hrs  T(C): 37 (17 Aug 2020 16:28), Max: 37 (17 Aug 2020 16:28)  T(F): 98.6 (17 Aug 2020 16:28), Max: 98.6 (17 Aug 2020 16:28)  HR: 75 (17 Aug 2020 17:16) (71 - 98)  BP: 89/54 (17 Aug 2020 17:16) (89/54 - 103/71)  BP(mean): --  RR: 18 (17 Aug 2020 16:28) (18 - 18)  SpO2: --    Cat 1 fhr tracing, fhr 135 with mod variability, accels, no decels  acontractile on toco

## 2020-08-17 NOTE — OB PROVIDER TRIAGE NOTE - HISTORY OF PRESENT ILLNESS
34 y.o.  @ 29.6 weeks c/o left sided abdominal pain intermittently since Friday. pt reports that currently pain is 3/10 and at it's worse it is 10/10 and feels like stabbing. Pain is more significant when changing position, laughing, coughing. Pain lasts about 10-15 min when it occurs and slowly subsides in intensity. pt denies any fevers, back pain, n/v/d, dysuria. Pt denies any contractions, vb or lof. She reports good fetal movement. She is scheduled for f/u ultrasound on Thursday for suspected polyhydramnios.

## 2020-08-17 NOTE — OB PROVIDER TRIAGE NOTE - PMH
Anxiety    Depression    Herniated nucleus pulposus    PCOS (polycystic ovarian syndrome)    Postoperative nausea and vomiting

## 2020-08-17 NOTE — OB PROVIDER TRIAGE NOTE - NS_OBGYNHISTORY_OBGYN_ALL_OB_FT
2/28/2012 FTC/S LGA 9lb 11oz  11/12/2013 FTC/S 10lb 1oz GDM  1/2018 SAB   1/2019 SAB with D&C    Currently AP significant for -  suspected polyhydramnios (has f/u ultrasound 8/21)

## 2020-08-17 NOTE — OB PROVIDER TRIAGE NOTE - NSOBPROVIDERNOTE_OBGYN_ALL_OB_FT
d/w Dr. Calles  pt discharged home - reassuring fetal and maternal status  f/u with next appointment  ptl precautions reviewe  daily kick counts reviewed

## 2020-08-18 DIAGNOSIS — R11.2 NAUSEA WITH VOMITING, UNSPECIFIED: ICD-10-CM

## 2020-08-20 ENCOUNTER — APPOINTMENT (OUTPATIENT)
Dept: INFUSION THERAPY | Facility: HOSPITAL | Age: 35
End: 2020-08-20

## 2020-08-24 ENCOUNTER — APPOINTMENT (OUTPATIENT)
Dept: ANTEPARTUM | Facility: CLINIC | Age: 35
End: 2020-08-24
Payer: COMMERCIAL

## 2020-08-24 ENCOUNTER — ASOB RESULT (OUTPATIENT)
Age: 35
End: 2020-08-24

## 2020-08-24 ENCOUNTER — APPOINTMENT (OUTPATIENT)
Dept: INFUSION THERAPY | Facility: HOSPITAL | Age: 35
End: 2020-08-24

## 2020-08-24 PROCEDURE — 76819 FETAL BIOPHYS PROFIL W/O NST: CPT

## 2020-08-24 PROCEDURE — 76816 OB US FOLLOW-UP PER FETUS: CPT

## 2020-08-24 PROCEDURE — 99241 OFFICE CONSULTATION NEW/ESTAB PATIENT 15 MIN: CPT | Mod: 25

## 2020-08-25 ENCOUNTER — OUTPATIENT (OUTPATIENT)
Dept: OUTPATIENT SERVICES | Facility: HOSPITAL | Age: 35
LOS: 1 days | Discharge: HOME | End: 2020-08-25

## 2020-08-25 DIAGNOSIS — Z90.49 ACQUIRED ABSENCE OF OTHER SPECIFIED PARTS OF DIGESTIVE TRACT: Chronic | ICD-10-CM

## 2020-08-25 DIAGNOSIS — Z98.84 BARIATRIC SURGERY STATUS: Chronic | ICD-10-CM

## 2020-08-25 DIAGNOSIS — D64.9 ANEMIA, UNSPECIFIED: ICD-10-CM

## 2020-08-25 DIAGNOSIS — Z98.890 OTHER SPECIFIED POSTPROCEDURAL STATES: Chronic | ICD-10-CM

## 2020-08-31 ENCOUNTER — APPOINTMENT (OUTPATIENT)
Dept: INFUSION THERAPY | Facility: HOSPITAL | Age: 35
End: 2020-08-31

## 2020-09-01 DIAGNOSIS — E83.111 HEMOCHROMATOSIS DUE TO REPEATED RED BLOOD CELL TRANSFUSIONS: ICD-10-CM

## 2020-09-01 DIAGNOSIS — D50.9 IRON DEFICIENCY ANEMIA, UNSPECIFIED: ICD-10-CM

## 2020-09-01 DIAGNOSIS — E83.81 HUNGRY BONE SYNDROME: ICD-10-CM

## 2020-09-14 ENCOUNTER — APPOINTMENT (OUTPATIENT)
Dept: ANTEPARTUM | Facility: CLINIC | Age: 35
End: 2020-09-14

## 2020-09-21 ENCOUNTER — RESULT REVIEW (OUTPATIENT)
Age: 35
End: 2020-09-21

## 2020-09-21 ENCOUNTER — APPOINTMENT (OUTPATIENT)
Dept: HEMATOLOGY ONCOLOGY | Facility: CLINIC | Age: 35
End: 2020-09-21
Payer: COMMERCIAL

## 2020-09-21 VITALS
RESPIRATION RATE: 17 BRPM | BODY MASS INDEX: 38.03 KG/M2 | TEMPERATURE: 97.9 F | HEART RATE: 91 BPM | SYSTOLIC BLOOD PRESSURE: 105 MMHG | DIASTOLIC BLOOD PRESSURE: 71 MMHG | OXYGEN SATURATION: 99 % | WEIGHT: 225.53 LBS

## 2020-09-21 LAB
BASOPHILS # BLD AUTO: 0.07 K/UL — SIGNIFICANT CHANGE UP (ref 0–0.2)
BASOPHILS NFR BLD AUTO: 0.6 % — SIGNIFICANT CHANGE UP (ref 0–2)
EOSINOPHIL # BLD AUTO: 0.12 K/UL — SIGNIFICANT CHANGE UP (ref 0–0.5)
EOSINOPHIL NFR BLD AUTO: 1 % — SIGNIFICANT CHANGE UP (ref 0–6)
HCT VFR BLD CALC: 31.5 % — LOW (ref 34.5–45)
HGB BLD-MCNC: 10.4 G/DL — LOW (ref 11.5–15.5)
IMM GRANULOCYTES NFR BLD AUTO: 3.2 % — HIGH (ref 0–1.5)
LYMPHOCYTES # BLD AUTO: 24.3 % — SIGNIFICANT CHANGE UP (ref 13–44)
LYMPHOCYTES # BLD AUTO: 3.05 K/UL — SIGNIFICANT CHANGE UP (ref 1–3.3)
MCHC RBC-ENTMCNC: 28.3 PG — SIGNIFICANT CHANGE UP (ref 27–34)
MCHC RBC-ENTMCNC: 33 G/DL — SIGNIFICANT CHANGE UP (ref 32–36)
MCV RBC AUTO: 85.8 FL — SIGNIFICANT CHANGE UP (ref 80–100)
MONOCYTES # BLD AUTO: 1.21 K/UL — HIGH (ref 0–0.9)
MONOCYTES NFR BLD AUTO: 9.7 % — SIGNIFICANT CHANGE UP (ref 2–14)
NEUTROPHILS # BLD AUTO: 7.68 K/UL — HIGH (ref 1.8–7.4)
NEUTROPHILS NFR BLD AUTO: 61.2 % — SIGNIFICANT CHANGE UP (ref 43–77)
NRBC # BLD: 0 /100 WBCS — SIGNIFICANT CHANGE UP (ref 0–0)
PLATELET # BLD AUTO: 264 K/UL — SIGNIFICANT CHANGE UP (ref 150–400)
RBC # BLD: 3.67 M/UL — LOW (ref 3.8–5.2)
RBC # FLD: 15.7 % — HIGH (ref 10.3–14.5)
WBC # BLD: 12.53 K/UL — HIGH (ref 3.8–10.5)
WBC # FLD AUTO: 12.53 K/UL — HIGH (ref 3.8–10.5)

## 2020-09-21 PROCEDURE — 99214 OFFICE O/P EST MOD 30 MIN: CPT

## 2020-09-22 ENCOUNTER — OUTPATIENT (OUTPATIENT)
Dept: OUTPATIENT SERVICES | Facility: HOSPITAL | Age: 35
LOS: 1 days | Discharge: HOME | End: 2020-09-22

## 2020-09-22 DIAGNOSIS — Z98.890 OTHER SPECIFIED POSTPROCEDURAL STATES: Chronic | ICD-10-CM

## 2020-09-22 DIAGNOSIS — Z98.84 BARIATRIC SURGERY STATUS: Chronic | ICD-10-CM

## 2020-09-22 DIAGNOSIS — Z90.49 ACQUIRED ABSENCE OF OTHER SPECIFIED PARTS OF DIGESTIVE TRACT: Chronic | ICD-10-CM

## 2020-09-22 DIAGNOSIS — D64.9 ANEMIA, UNSPECIFIED: ICD-10-CM

## 2020-09-23 LAB
FERRITIN SERPL-MCNC: 88 NG/ML
IRON SATN MFR SERPL: 17 %
IRON SERPL-MCNC: 80 UG/DL
TIBC SERPL-MCNC: 470 UG/DL
UIBC SERPL-MCNC: 390 UG/DL
VIT B12 SERPL-MCNC: 332 PG/ML

## 2020-09-23 RX ORDER — VITAMIN C, CALCIUM, IRON, VITAMIN D3, VITAMIN E, THIAMIN, RIBOFLAVIN, NIACINAMIDE, VITAMIN B6, FOLIC ACID, IODINE, ZINC, COPPER, DOCUSATE SODIUM 120; 85; 30; 3; 20; 20; 1; 25; 2; 50; 159; 4.54; 150; 5; 400; 3.4 MG/1; MG/1; [IU]/1; MG/1; MG/1; MG/1; MG/1; MG/1; MG/1; MG/1; MG/1; MG/1; UG/1; MG/1; [IU]/1; MG/1
90-1 & 300 TABLET ORAL
Qty: 180 | Refills: 0 | Status: COMPLETED | COMMUNITY
Start: 2020-03-27

## 2020-09-23 RX ORDER — AMOXICILLIN 500 MG/1
500 CAPSULE ORAL
Qty: 14 | Refills: 0 | Status: COMPLETED | COMMUNITY
Start: 2020-07-24

## 2020-09-23 RX ORDER — METRONIDAZOLE 7.5 MG/G
0.75 CREAM TOPICAL
Qty: 45 | Refills: 0 | Status: COMPLETED | COMMUNITY
Start: 2020-04-27

## 2020-09-23 NOTE — HISTORY OF PRESENT ILLNESS
[Disease:__________________________] : Disease: [unfilled] [de-identified] : Aug 6, 2020\par 33 yo female with hx gastric sleeve in 2014, s/p cholecystectomy  and abdominal hernia repair s/p  x2, spinal laminectomy in , now  @ 28 weeks gestation (2 live births, 2 miscarriages), referred by Ob/gyn for iron deficiency anemia. Pt reports hx of fatigue and constipation. She was not taking prenatal vitamins due to severe nausea with pills. Also reports ongoing nausea with Vit D tabs. Pt was noted on labs to have Hb 9.3g in 2020. Given ongoing complaints, pt was referred to hematology for consideration of IV iron infusion. Pt denies need for prior IV iron. She denies any hx of prbc transfusion. She denies any palpitations or active bleeding. \par LMP 2020\par CASSIE 10/27/2020\par \par Ob/Gyn - Adolfo [phone], [address] [facility], [address] [facility], [place] [address] [de-identified] : moderate [FreeTextEntry1] : IV iron and IM B12 [de-identified] : Pt is here for scheduled appt. She completed 6 doses of IV venofer and 4 doses of IM B12. She is having occasional Ej-Dillon. She remains fatigued and is concerned anemia is persistent. She denies any bleeding. Her planned  date is 10/26/2020.

## 2020-09-23 NOTE — PHYSICAL EXAM
[Restricted in physically strenuous activity but ambulatory and able to carry out work of a light or sedentary nature] : Status 1- Restricted in physically strenuous activity but ambulatory and able to carry out work of a light or sedentary nature, e.g., light house work, office work [Normal] : affect appropriate [de-identified] : gravid abdomen +FM

## 2020-09-28 ENCOUNTER — APPOINTMENT (OUTPATIENT)
Dept: INFUSION THERAPY | Facility: HOSPITAL | Age: 35
End: 2020-09-28

## 2020-09-29 DIAGNOSIS — D50.9 IRON DEFICIENCY ANEMIA, UNSPECIFIED: ICD-10-CM

## 2020-09-29 DIAGNOSIS — R11.2 NAUSEA WITH VOMITING, UNSPECIFIED: ICD-10-CM

## 2020-09-30 ENCOUNTER — APPOINTMENT (OUTPATIENT)
Dept: INFUSION THERAPY | Facility: HOSPITAL | Age: 35
End: 2020-09-30

## 2020-10-02 ENCOUNTER — APPOINTMENT (OUTPATIENT)
Dept: INFUSION THERAPY | Facility: HOSPITAL | Age: 35
End: 2020-10-02

## 2020-10-02 ENCOUNTER — TRANSCRIPTION ENCOUNTER (OUTPATIENT)
Age: 35
End: 2020-10-02

## 2020-10-10 ENCOUNTER — TRANSCRIPTION ENCOUNTER (OUTPATIENT)
Age: 35
End: 2020-10-10

## 2020-10-11 ENCOUNTER — RESULT REVIEW (OUTPATIENT)
Age: 35
End: 2020-10-11

## 2020-10-11 ENCOUNTER — TRANSCRIPTION ENCOUNTER (OUTPATIENT)
Age: 35
End: 2020-10-11

## 2020-10-11 ENCOUNTER — INPATIENT (INPATIENT)
Facility: HOSPITAL | Age: 35
LOS: 2 days | Discharge: HOME | End: 2020-10-14
Attending: OBSTETRICS & GYNECOLOGY | Admitting: OBSTETRICS & GYNECOLOGY
Payer: COMMERCIAL

## 2020-10-11 VITALS — TEMPERATURE: 98 F

## 2020-10-11 DIAGNOSIS — Z98.890 OTHER SPECIFIED POSTPROCEDURAL STATES: Chronic | ICD-10-CM

## 2020-10-11 DIAGNOSIS — O34.211 MATERNAL CARE FOR LOW TRANSVERSE SCAR FROM PREVIOUS CESAREAN DELIVERY: ICD-10-CM

## 2020-10-11 DIAGNOSIS — Z3A.00 WEEKS OF GESTATION OF PREGNANCY NOT SPECIFIED: ICD-10-CM

## 2020-10-11 DIAGNOSIS — Z90.49 ACQUIRED ABSENCE OF OTHER SPECIFIED PARTS OF DIGESTIVE TRACT: Chronic | ICD-10-CM

## 2020-10-11 DIAGNOSIS — Z98.84 BARIATRIC SURGERY STATUS: Chronic | ICD-10-CM

## 2020-10-11 DIAGNOSIS — O26.899 OTHER SPECIFIED PREGNANCY RELATED CONDITIONS, UNSPECIFIED TRIMESTER: ICD-10-CM

## 2020-10-11 LAB
BASOPHILS # BLD AUTO: 0.12 K/UL — SIGNIFICANT CHANGE UP (ref 0–0.2)
BASOPHILS NFR BLD AUTO: 0.7 % — SIGNIFICANT CHANGE UP (ref 0–2)
BLD GP AB SCN SERPL QL: NEGATIVE — SIGNIFICANT CHANGE UP
EOSINOPHIL # BLD AUTO: 0.13 K/UL — SIGNIFICANT CHANGE UP (ref 0–0.5)
EOSINOPHIL NFR BLD AUTO: 0.7 % — SIGNIFICANT CHANGE UP (ref 0–6)
HCT VFR BLD CALC: 36.8 % — SIGNIFICANT CHANGE UP (ref 34.5–45)
HGB BLD-MCNC: 11.4 G/DL — LOW (ref 11.5–15.5)
IMM GRANULOCYTES NFR BLD AUTO: 3.5 % — HIGH (ref 0–1.5)
LYMPHOCYTES # BLD AUTO: 19.5 % — SIGNIFICANT CHANGE UP (ref 13–44)
LYMPHOCYTES # BLD AUTO: 3.41 K/UL — HIGH (ref 1–3.3)
MCHC RBC-ENTMCNC: 28.5 PG — SIGNIFICANT CHANGE UP (ref 27–34)
MCHC RBC-ENTMCNC: 31 % — LOW (ref 32–36)
MCV RBC AUTO: 92 FL — SIGNIFICANT CHANGE UP (ref 80–100)
MONOCYTES # BLD AUTO: 1.72 K/UL — HIGH (ref 0–0.9)
MONOCYTES NFR BLD AUTO: 9.8 % — SIGNIFICANT CHANGE UP (ref 2–14)
NEUTROPHILS # BLD AUTO: 11.5 K/UL — HIGH (ref 1.8–7.4)
NEUTROPHILS NFR BLD AUTO: 65.8 % — SIGNIFICANT CHANGE UP (ref 43–77)
NRBC # FLD: 0 K/UL — SIGNIFICANT CHANGE UP (ref 0–0)
PLATELET # BLD AUTO: 268 K/UL — SIGNIFICANT CHANGE UP (ref 150–400)
PMV BLD: 11.3 FL — SIGNIFICANT CHANGE UP (ref 7–13)
RBC # BLD: 4 M/UL — SIGNIFICANT CHANGE UP (ref 3.8–5.2)
RBC # FLD: 16.4 % — HIGH (ref 10.3–14.5)
RH IG SCN BLD-IMP: POSITIVE — SIGNIFICANT CHANGE UP
SARS-COV-2 IGG SERPL QL IA: NEGATIVE — SIGNIFICANT CHANGE UP
SARS-COV-2 IGM SERPL IA-ACNC: 0.1 INDEX — SIGNIFICANT CHANGE UP
SARS-COV-2 RNA SPEC QL NAA+PROBE: SIGNIFICANT CHANGE UP
WBC # BLD: 17.49 K/UL — HIGH (ref 3.8–10.5)
WBC # FLD AUTO: 17.49 K/UL — HIGH (ref 3.8–10.5)

## 2020-10-11 PROCEDURE — 88302 TISSUE EXAM BY PATHOLOGIST: CPT | Mod: 26

## 2020-10-11 RX ORDER — SODIUM CHLORIDE 9 MG/ML
1000 INJECTION, SOLUTION INTRAVENOUS ONCE
Refills: 0 | Status: DISCONTINUED | OUTPATIENT
Start: 2020-10-11 | End: 2020-10-11

## 2020-10-11 RX ORDER — SIMETHICONE 80 MG/1
80 TABLET, CHEWABLE ORAL EVERY 4 HOURS
Refills: 0 | Status: DISCONTINUED | OUTPATIENT
Start: 2020-10-11 | End: 2020-10-14

## 2020-10-11 RX ORDER — ACETAMINOPHEN 500 MG
3 TABLET ORAL
Qty: 0 | Refills: 0 | DISCHARGE
Start: 2020-10-11

## 2020-10-11 RX ORDER — METOCLOPRAMIDE HCL 10 MG
10 TABLET ORAL ONCE
Refills: 0 | Status: DISCONTINUED | OUTPATIENT
Start: 2020-10-11 | End: 2020-10-11

## 2020-10-11 RX ORDER — IBUPROFEN 200 MG
1 TABLET ORAL
Qty: 0 | Refills: 0 | DISCHARGE
Start: 2020-10-11

## 2020-10-11 RX ORDER — CITRIC ACID/SODIUM CITRATE 300-500 MG
30 SOLUTION, ORAL ORAL ONCE
Refills: 0 | Status: DISCONTINUED | OUTPATIENT
Start: 2020-10-11 | End: 2020-10-11

## 2020-10-11 RX ORDER — TETANUS TOXOID, REDUCED DIPHTHERIA TOXOID AND ACELLULAR PERTUSSIS VACCINE, ADSORBED 5; 2.5; 8; 8; 2.5 [IU]/.5ML; [IU]/.5ML; UG/.5ML; UG/.5ML; UG/.5ML
0.5 SUSPENSION INTRAMUSCULAR ONCE
Refills: 0 | Status: COMPLETED | OUTPATIENT
Start: 2020-10-11

## 2020-10-11 RX ORDER — METOCLOPRAMIDE HCL 10 MG
10 TABLET ORAL ONCE
Refills: 0 | Status: COMPLETED | OUTPATIENT
Start: 2020-10-11 | End: 2020-10-11

## 2020-10-11 RX ORDER — OXYCODONE HYDROCHLORIDE 5 MG/1
5 TABLET ORAL ONCE
Refills: 0 | Status: DISCONTINUED | OUTPATIENT
Start: 2020-10-11 | End: 2020-10-14

## 2020-10-11 RX ORDER — OXYTOCIN 10 UNIT/ML
333.33 VIAL (ML) INJECTION
Qty: 20 | Refills: 0 | Status: DISCONTINUED | OUTPATIENT
Start: 2020-10-11 | End: 2020-10-12

## 2020-10-11 RX ORDER — AMPICILLIN TRIHYDRATE 250 MG
1 CAPSULE ORAL EVERY 4 HOURS
Refills: 0 | Status: DISCONTINUED | OUTPATIENT
Start: 2020-10-11 | End: 2020-10-11

## 2020-10-11 RX ORDER — CITRIC ACID/SODIUM CITRATE 300-500 MG
30 SOLUTION, ORAL ORAL ONCE
Refills: 0 | Status: COMPLETED | OUTPATIENT
Start: 2020-10-11 | End: 2020-10-11

## 2020-10-11 RX ORDER — OXYCODONE HYDROCHLORIDE 5 MG/1
5 TABLET ORAL
Refills: 0 | Status: DISCONTINUED | OUTPATIENT
Start: 2020-10-11 | End: 2020-10-14

## 2020-10-11 RX ORDER — DIPHENHYDRAMINE HCL 50 MG
25 CAPSULE ORAL EVERY 6 HOURS
Refills: 0 | Status: DISCONTINUED | OUTPATIENT
Start: 2020-10-11 | End: 2020-10-14

## 2020-10-11 RX ORDER — OXYCODONE HYDROCHLORIDE 5 MG/1
5 TABLET ORAL
Refills: 0 | Status: COMPLETED | OUTPATIENT
Start: 2020-10-11 | End: 2020-10-18

## 2020-10-11 RX ORDER — IBUPROFEN 200 MG
600 TABLET ORAL EVERY 6 HOURS
Refills: 0 | Status: COMPLETED | OUTPATIENT
Start: 2020-10-11 | End: 2021-09-09

## 2020-10-11 RX ORDER — SERTRALINE 25 MG/1
50 TABLET, FILM COATED ORAL DAILY
Refills: 0 | Status: DISCONTINUED | OUTPATIENT
Start: 2020-10-11 | End: 2020-10-14

## 2020-10-11 RX ORDER — AMPICILLIN TRIHYDRATE 250 MG
2 CAPSULE ORAL ONCE
Refills: 0 | Status: COMPLETED | OUTPATIENT
Start: 2020-10-11 | End: 2020-10-11

## 2020-10-11 RX ORDER — FAMOTIDINE 10 MG/ML
20 INJECTION INTRAVENOUS ONCE
Refills: 0 | Status: COMPLETED | OUTPATIENT
Start: 2020-10-11 | End: 2020-10-11

## 2020-10-11 RX ORDER — CITRIC ACID/SODIUM CITRATE 300-500 MG
15 SOLUTION, ORAL ORAL ONCE
Refills: 0 | Status: DISCONTINUED | OUTPATIENT
Start: 2020-10-11 | End: 2020-10-14

## 2020-10-11 RX ORDER — SIMETHICONE 80 MG/1
1 TABLET, CHEWABLE ORAL
Qty: 0 | Refills: 0 | DISCHARGE
Start: 2020-10-11

## 2020-10-11 RX ORDER — ACETAMINOPHEN 500 MG
975 TABLET ORAL
Refills: 0 | Status: DISCONTINUED | OUTPATIENT
Start: 2020-10-11 | End: 2020-10-14

## 2020-10-11 RX ORDER — LANOLIN
1 OINTMENT (GRAM) TOPICAL EVERY 6 HOURS
Refills: 0 | Status: DISCONTINUED | OUTPATIENT
Start: 2020-10-11 | End: 2020-10-14

## 2020-10-11 RX ORDER — FAMOTIDINE 10 MG/ML
20 INJECTION INTRAVENOUS ONCE
Refills: 0 | Status: DISCONTINUED | OUTPATIENT
Start: 2020-10-11 | End: 2020-10-11

## 2020-10-11 RX ORDER — OXYTOCIN 10 UNIT/ML
333.33 VIAL (ML) INJECTION
Qty: 20 | Refills: 0 | Status: DISCONTINUED | OUTPATIENT
Start: 2020-10-11 | End: 2020-10-11

## 2020-10-11 RX ORDER — INFLUENZA VIRUS VACCINE 15; 15; 15; 15 UG/.5ML; UG/.5ML; UG/.5ML; UG/.5ML
0.5 SUSPENSION INTRAMUSCULAR ONCE
Refills: 0 | Status: COMPLETED | OUTPATIENT
Start: 2020-10-11 | End: 2020-10-11

## 2020-10-11 RX ORDER — HEPARIN SODIUM 5000 [USP'U]/ML
5000 INJECTION INTRAVENOUS; SUBCUTANEOUS EVERY 12 HOURS
Refills: 0 | Status: DISCONTINUED | OUTPATIENT
Start: 2020-10-11 | End: 2020-10-14

## 2020-10-11 RX ORDER — KETOROLAC TROMETHAMINE 30 MG/ML
30 SYRINGE (ML) INJECTION EVERY 6 HOURS
Refills: 0 | Status: DISCONTINUED | OUTPATIENT
Start: 2020-10-11 | End: 2020-10-12

## 2020-10-11 RX ORDER — FAMOTIDINE 10 MG/ML
20 INJECTION INTRAVENOUS DAILY
Refills: 0 | Status: DISCONTINUED | OUTPATIENT
Start: 2020-10-11 | End: 2020-10-14

## 2020-10-11 RX ORDER — SODIUM CHLORIDE 9 MG/ML
1000 INJECTION, SOLUTION INTRAVENOUS
Refills: 0 | Status: DISCONTINUED | OUTPATIENT
Start: 2020-10-11 | End: 2020-10-11

## 2020-10-11 RX ORDER — HEPARIN SODIUM 5000 [USP'U]/ML
5000 INJECTION INTRAVENOUS; SUBCUTANEOUS EVERY 12 HOURS
Refills: 0 | Status: DISCONTINUED | OUTPATIENT
Start: 2020-10-11 | End: 2020-10-11

## 2020-10-11 RX ORDER — HYDROMORPHONE HYDROCHLORIDE 2 MG/ML
0.5 INJECTION INTRAMUSCULAR; INTRAVENOUS; SUBCUTANEOUS
Refills: 0 | Status: DISCONTINUED | OUTPATIENT
Start: 2020-10-11 | End: 2020-10-11

## 2020-10-11 RX ORDER — MAGNESIUM HYDROXIDE 400 MG/1
30 TABLET, CHEWABLE ORAL
Refills: 0 | Status: DISCONTINUED | OUTPATIENT
Start: 2020-10-11 | End: 2020-10-14

## 2020-10-11 RX ADMIN — Medication 975 MILLIGRAM(S): at 10:20

## 2020-10-11 RX ADMIN — Medication 1000 MILLIUNIT(S)/MIN: at 05:35

## 2020-10-11 RX ADMIN — SIMETHICONE 80 MILLIGRAM(S): 80 TABLET, CHEWABLE ORAL at 12:35

## 2020-10-11 RX ADMIN — Medication 30 MILLIGRAM(S): at 18:06

## 2020-10-11 RX ADMIN — Medication 30 MILLIGRAM(S): at 13:00

## 2020-10-11 RX ADMIN — Medication 10 MILLIGRAM(S): at 03:00

## 2020-10-11 RX ADMIN — Medication 30 MILLILITER(S): at 03:12

## 2020-10-11 RX ADMIN — OXYCODONE HYDROCHLORIDE 5 MILLIGRAM(S): 5 TABLET ORAL at 20:22

## 2020-10-11 RX ADMIN — SERTRALINE 50 MILLIGRAM(S): 25 TABLET, FILM COATED ORAL at 12:36

## 2020-10-11 RX ADMIN — Medication 30 MILLIGRAM(S): at 12:35

## 2020-10-11 RX ADMIN — HYDROMORPHONE HYDROCHLORIDE 0.5 MILLIGRAM(S): 2 INJECTION INTRAMUSCULAR; INTRAVENOUS; SUBCUTANEOUS at 08:20

## 2020-10-11 RX ADMIN — HYDROMORPHONE HYDROCHLORIDE 0.5 MILLIGRAM(S): 2 INJECTION INTRAMUSCULAR; INTRAVENOUS; SUBCUTANEOUS at 08:06

## 2020-10-11 RX ADMIN — OXYCODONE HYDROCHLORIDE 5 MILLIGRAM(S): 5 TABLET ORAL at 21:22

## 2020-10-11 RX ADMIN — Medication 975 MILLIGRAM(S): at 18:18

## 2020-10-11 RX ADMIN — HEPARIN SODIUM 5000 UNIT(S): 5000 INJECTION INTRAVENOUS; SUBCUTANEOUS at 18:10

## 2020-10-11 RX ADMIN — SODIUM CHLORIDE 75 MILLILITER(S): 9 INJECTION, SOLUTION INTRAVENOUS at 05:35

## 2020-10-11 RX ADMIN — SIMETHICONE 80 MILLIGRAM(S): 80 TABLET, CHEWABLE ORAL at 18:06

## 2020-10-11 RX ADMIN — Medication 975 MILLIGRAM(S): at 17:18

## 2020-10-11 RX ADMIN — FAMOTIDINE 20 MILLIGRAM(S): 10 INJECTION INTRAVENOUS at 03:00

## 2020-10-11 RX ADMIN — FAMOTIDINE 20 MILLIGRAM(S): 10 INJECTION INTRAVENOUS at 22:05

## 2020-10-11 RX ADMIN — Medication 30 MILLIGRAM(S): at 18:20

## 2020-10-11 RX ADMIN — Medication 216 GRAM(S): at 02:10

## 2020-10-11 RX ADMIN — Medication 975 MILLIGRAM(S): at 11:20

## 2020-10-11 NOTE — BRIEF OPERATIVE NOTE - NSICDXBRIEFPROCEDURE_GEN_ALL_CORE_FT
PROCEDURES:  Repeat  section with salpingectomy 11-Oct-2020 05:41:49  Franck Queen  
PROCEDURES:  Bilateral salpingectomy 11-Oct-2020 05:43:07  Isaac Bhatt  Repeat low transverse  section 11-Oct-2020 05:42:57  Isaac Bhatt

## 2020-10-11 NOTE — OB RN DELIVERY SUMMARY - NS_SEPSISRSKCALC_OBGYN_ALL_OB_FT
No temperature has been documented for this patient in CPN or on the OB Flowsheet. Ensure the highest temperature during labor was documented on the OB Flowsheet.  No gestational age at birth has been documented. Ensure delivery date/time has been entered above.  Rupture of membranes must be entered above.   EOS calculated successfully. EOS Risk Factor: 0.5/1000 live births ([facility] national incidence); GA=37w5d; Temp=98.42; ROM=5.55; GBS='Positive'; Antibiotics='Broad spectrum antibiotics 2-3.9 hrs prior to birth'

## 2020-10-11 NOTE — OB NEONATOLOGY/PEDIATRICIAN DELIVERY SUMMARY - BABY A: APGAR 5 MIN RESP RATE, DELIVERY
Administered By (Optional): JANUARY Include Z78.9 (Other Specified Conditions Influencing Health Status) As An Associated Diagnosis?: No Kenalog Preparation: Kenalog Total Volume (Ccs): 0.5 Detail Level: Detailed X Size Of Lesion In Cm (Optional): 0 Medical Necessity Clause: This procedure was medically necessary because the lesions that were treated were: Consent: The risks of atrophy were reviewed with the patient. Concentration Of Kenalog Solution Injected (Mg/Ml): 5.0 (2) good, crying

## 2020-10-11 NOTE — OB RN TRIAGE NOTE - CHIEF COMPLAINT QUOTE
" I broke my water, clear and pink fluid." " I broke my water, clear and pink fluid at 22.30 on 10/10/2020". " I broke my water, clear and pink fluid at 22.30 on 10/10/2020". Previous C/S X2, scheduled for repeat C/S on 10/23/20.

## 2020-10-11 NOTE — DISCHARGE NOTE OB - CARE PLAN
Principal Discharge DX:	 delivery delivered  Goal:	post partum  Assessment and plan of treatment:	recovery

## 2020-10-11 NOTE — OB RN TRIAGE NOTE - CURRENT PREGNANCY COMPLICATIONS, OB PROFILE
Polyhydramnios Maternal Medical Condition/anemia, iron transfusions X 8/Polyhydramnios Polyhydramnios/Maternal Medical Condition/Maternal Positive GBS/anemia, iron transfusions X 8

## 2020-10-11 NOTE — OB PROVIDER TRIAGE NOTE - NSOBPROVIDERNOTE_OBGYN_ALL_OB_FT
Vital Signs Last 24 Hrs  T(C): 36.7 (11 Oct 2020 00:22), Max: 36.7 (11 Oct 2020 00:21)  T(F): 98.1 (11 Oct 2020 00:22), Max: 98.1 (11 Oct 2020 00:22)  HR: 103 (11 Oct 2020 00:22) (103 - 103)  BP: 110/65 (11 Oct 2020 00:22) (110/65 - 110/65)  BP(mean): --  RR: 18 (11 Oct 2020 00:22) (18 - 18)  SpO2: --    General: A&O x3  Cardiac: R/R/R  Lungs: Anterior and posterior lung sounds clear upon auscultation  SVE:  +pooling  +nitrazine  +ferning   1/50/-3  TAS: Posterior placenta, dalia breech presentation    NST reactive with moderate variability, cat 1  toco ctx 3-7 minutes     35 y/o pt 37.5 weeks  admitted for repeat  for PROM   Discussed patient's history and physical exam with Dr Queen  Plan:  -Admit L&D. Routine labs  -Repeat  for PROM, will wait 8 hours post last PO  -NPO   -Fetus: category 1 tracing, breech presentation, continuos monitoring   -GBS negative  -Covid 19 expedited      huddle with Dr Bhatt, Dr Gibbs, Dr Monroy-Tye, Dr Pulliam, Charge RN Vital Signs Last 24 Hrs  T(C): 36.7 (11 Oct 2020 00:22), Max: 36.7 (11 Oct 2020 00:21)  T(F): 98.1 (11 Oct 2020 00:22), Max: 98.1 (11 Oct 2020 00:22)  HR: 103 (11 Oct 2020 00:22) (103 - 103)  BP: 110/65 (11 Oct 2020 00:22) (110/65 - 110/65)  BP(mean): --  RR: 18 (11 Oct 2020 00:22) (18 - 18)  SpO2: --    General: A&O x3  Cardiac: R/R/R  Lungs: Anterior and posterior lung sounds clear upon auscultation  SVE:  +pooling  +nitrazine  +ferning   1/50/-3  TAS: Posterior placenta, dalia breech presentation    NST reactive with moderate variability, cat 1  toco ctx 3-7 minutes     35 y/o pt 37.5 weeks  admitted for repeat  for PROM   Discussed patient's history and physical exam with Dr Queen  Plan:  -Admit L&D. Routine labs  -Repeat  for PROM, will wait 8 hours post last PO  -NPO   -Fetus: category 1 tracing, breech presentation, continuos monitoring   -GBS prophylaxis   -Covid 19 expedited   -Crossmatch 2units PRBC     huddle with Dr Bhatt, Dr Gibbs, Dr Monroy-Tye, Dr Pulliam, Charge RN

## 2020-10-11 NOTE — OB PROVIDER TRIAGE NOTE - NS_OBGYNHISTORY_OBGYN_ALL_OB_FT
OB:  Primary  failure to progress 2012 9#11    Repeat  2013 10#11 GDM A2  SAB x2 D&C x1  GYN:   PCOS

## 2020-10-11 NOTE — OB PROVIDER TRIAGE NOTE - NSHPPHYSICALEXAM_GEN_ALL_CORE
Vital Signs Last 24 Hrs  T(C): 36.7 (11 Oct 2020 00:22), Max: 36.7 (11 Oct 2020 00:21)  T(F): 98.1 (11 Oct 2020 00:22), Max: 98.1 (11 Oct 2020 00:22)  HR: 103 (11 Oct 2020 00:22) (103 - 103)  BP: 110/65 (11 Oct 2020 00:22) (110/65 - 110/65)  BP(mean): --  RR: 18 (11 Oct 2020 00:22) (18 - 18)  SpO2: --    General: A&O x3  Cardiac: R/R/R  Lungs: Anterior and posterior lung sounds clear upon auscultation  SVE:  +pooling  +nitrazine  +ferning   1/50/-3  TAS: Posterior placenta, dalia breech presentation    NST reactive with moderate variability, cat 1  toco ctx 3-7 minutes

## 2020-10-11 NOTE — DISCHARGE NOTE OB - MEDICATION SUMMARY - MEDICATIONS TO TAKE
I will START or STAY ON the medications listed below when I get home from the hospital:    acetaminophen 325 mg oral tablet  -- 3 tab(s) by mouth   -- Indication: For Labor and delivery, indication for care    ibuprofen 600 mg oral tablet  -- 1 tab(s) by mouth every 6 hours  -- Indication: For Labor and delivery, indication for care    Zoloft 50 mg oral tablet  -- 1 tab(s) by mouth once a day  -- Indication: For Labor and delivery, indication for care    simethicone 80 mg oral tablet, chewable  -- 1 tab(s) by mouth every 4 hours, As needed, Gas  -- Indication: For Labor and delivery, indication for care

## 2020-10-11 NOTE — DISCHARGE NOTE OB - MEDICATION SUMMARY - MEDICATIONS TO STOP TAKING
I will STOP taking the medications listed below when I get home from the hospital:    Vitamin D3 1000 intl units (25 mcg) oral capsule  -- orally once a day    aspirin 81 mg oral tablet  -- orally once a day

## 2020-10-11 NOTE — OB PROVIDER TRIAGE NOTE - HISTORY OF PRESENT ILLNESS
33 y/o pt 37.5 weeks  presents to triage with c/o rupture of membranes, clear odorless fluid @ 2230 and irregular contractions.  pt reports 7/10 pain with contraction. pt denies any bleeding, n/v/d, fever or chills. pt endorses +fetal movement.   AP complicated by polyhydramnios; Iron and B12 infusions  Last PO @ 1900, pt desires repeat      NKDA  PMH:   Anemia- iron transfusion during pregnancy x8  Vitamin B12 deficiency- weekly B12 infusions during pregnacy  Anxiety  Depression   PSH:  Laminectomy L4 & L5    Cholecystectomy   Gastric bypass 2014  Hernia repair   OB:  Primary  failure to progress 2012 9#11    Repeat  2013 10#11 GDM A2  SAB x2 D&C x1  GYN:   PCOS  Social hx: denies  Medications:  PNV  Vitamin D  Zoloft 50mg daily  Aspirin 81mg daily

## 2020-10-11 NOTE — DISCHARGE NOTE OB - CARE PROVIDER_API CALL
Franck Queen)  Obstetrics and Gynecology Obstetrics and Gynocology  [address] [place] [address]  Phone: [phone]  Fax: [phone]  Follow Up Time: 2 weeks

## 2020-10-11 NOTE — OB RN TRIAGE NOTE - MENTAL HEALTH CONDITIONS/SYMPTOMS, PROFILE
Your opinion matters! Thank you for choosing Dr. Jerry Clayton at [facility]. You may receive a survey in the mail about today's visit.  We always appreciate feedback.  It was a pleasure to care for you today!       Wear the shoe at all times. You do not have to sleep with it on.     Ice the foot 15 minutes every 1-2 hours as needed.     Elevate foot as often as you can.     Follow-up in 3 weeks. Please get your xrays before coming in. Xrays are at the Urgent care in this building.     Dr. Jerry Clayton   anxiety disorder/depression

## 2020-10-11 NOTE — DISCHARGE NOTE OB - PATIENT PORTAL LINK FT
You can access the FollowMyHealth Patient Portal offered by [facility] by registering at the following website: [email or web address]. By joining [email or web address]’s FollowMyHealth portal, you will also be able to view your health information using other applications (apps) compatible with our system.

## 2020-10-11 NOTE — BRIEF OPERATIVE NOTE - OPERATION/FINDINGS
viable male infant, APGARS 9/9, 3420g, breech presentation, grossly normal uterus, tubes and ovaries
viable male  in breech position   APGAR 9/9  Single layer hysterotomy closure   bilateral salpingectomy   juventino placed over hysterotomy   Fibular placed between posterior facial edge and rectus muscle   Hemostasis visualized

## 2020-10-11 NOTE — OB PROVIDER H&P - NS_TRIAGEMEMBRANE_OBGYN_ALL_OB_DT
10-Oct-2020 22:30
need for outpatient follow-up/return to ED if symptoms worsen, persist or questions arise/radiology results/lab results

## 2020-10-11 NOTE — OB PROVIDER H&P - PROBLEM SELECTOR PLAN 1
-Admit L&D. Routine labs  -Repeat  for PROM, will wait 8 hours post last PO  -NPO   -Fetus: category 1 tracing, breech presentation, continuos monitoring   -GBS negative  -Covid 19 expedited   -Crossmatch 2 units PRBC

## 2020-10-11 NOTE — OB NEONATOLOGY/PEDIATRICIAN DELIVERY SUMMARY - NSPEDSNEONOTESA_OBGYN_ALL_OB_FT
37.5 wk male born via CS for breech presentation and repeat to a 35y/o  blood type O+ mother. Maternal history of anemia, anxiety and depression on Zoloft.  No significant prenatal history. PNL -/-/NR/I, GBS +, received amp x1. SROM at 22:30 with clear fluids. Baby emerged vigorous, crying, was w/d/s/s with APGARS of 9/9. Mom plans to initiate breastfeeding, consents Hep B vaccine and consents circ.  EOS 0.16.

## 2020-10-11 NOTE — OB PROVIDER H&P - ASSESSMENT
Vital Signs Last 24 Hrs  T(C): 36.7 (11 Oct 2020 00:22), Max: 36.7 (11 Oct 2020 00:21)  T(F): 98.1 (11 Oct 2020 00:22), Max: 98.1 (11 Oct 2020 00:22)  HR: 103 (11 Oct 2020 00:22) (103 - 103)  BP: 110/65 (11 Oct 2020 00:22) (110/65 - 110/65)  BP(mean): --  RR: 18 (11 Oct 2020 00:22) (18 - 18)  SpO2: --    General: A&O x3  Cardiac: R/R/R  Lungs: Anterior and posterior lung sounds clear upon auscultation  SVE:  +pooling  +nitrazine  +ferning   1/50/-3  TAS: Posterior placenta, dalia breech presentation    NST reactive with moderate variability, cat 1  toco ctx 3-7 minutes     35 y/o pt 37.5 weeks  admitted for repeat  for PROM   Discussed patient's history and physical exam with Dr Queen  Plan:  -Admit L&D. Routine labs  -Repeat  for PROM, will wait 8 hours post last PO  -NPO   -Fetus: category 1 tracing, breech presentation, continuos monitoring   -GBS negative  -Covid 19 expedited   -Crossmatch 2units PRBC     huddle with Dr Bhatt, Dr Gibbs, Dr Monroy-Tye, Dr Pulliam, Charge RN Vital Signs Last 24 Hrs  T(C): 36.7 (11 Oct 2020 00:22), Max: 36.7 (11 Oct 2020 00:21)  T(F): 98.1 (11 Oct 2020 00:22), Max: 98.1 (11 Oct 2020 00:22)  HR: 103 (11 Oct 2020 00:22) (103 - 103)  BP: 110/65 (11 Oct 2020 00:22) (110/65 - 110/65)  BP(mean): --  RR: 18 (11 Oct 2020 00:22) (18 - 18)  SpO2: --    General: A&O x3  Cardiac: R/R/R  Lungs: Anterior and posterior lung sounds clear upon auscultation  SVE:  +pooling  +nitrazine  +ferning   1/50/-3  TAS: Posterior placenta, dalia breech presentation    NST reactive with moderate variability, cat 1  toco ctx 3-7 minutes     33 y/o pt 37.5 weeks  admitted for repeat  for PROM   Discussed patient's history and physical exam with Dr Queen  Plan:  -Admit L&D. Routine labs  -Repeat  for PROM, will wait 8 hours post last PO  -NPO   -Fetus: category 1 tracing, breech presentation, continuos monitoring   -GBS prophylaxis   -Covid 19 expedited   -Crossmatch 2units PRBC     huddle with Dr Bhatt, Dr Gibbs, Dr Monroy-Tye, Dr Pulliam, Charge RN

## 2020-10-11 NOTE — OB PROVIDER DELIVERY SUMMARY - NSPROVIDERDELIVERYNOTE_OBGYN_ALL_OB_FT
viable male infant, APGARS 9/9, 3420g, breech presentation, grossly normal uterus, tubes and ovaries

## 2020-10-12 LAB
BASOPHILS # BLD AUTO: 0.07 K/UL — SIGNIFICANT CHANGE UP (ref 0–0.2)
BASOPHILS NFR BLD AUTO: 0.5 % — SIGNIFICANT CHANGE UP (ref 0–2)
EOSINOPHIL # BLD AUTO: 0.18 K/UL — SIGNIFICANT CHANGE UP (ref 0–0.5)
EOSINOPHIL NFR BLD AUTO: 1.2 % — SIGNIFICANT CHANGE UP (ref 0–6)
HCT VFR BLD CALC: 29.8 % — LOW (ref 34.5–45)
HGB BLD-MCNC: 9.5 G/DL — LOW (ref 11.5–15.5)
IMM GRANULOCYTES NFR BLD AUTO: 2.1 % — HIGH (ref 0–1.5)
LYMPHOCYTES # BLD AUTO: 19.2 % — SIGNIFICANT CHANGE UP (ref 13–44)
LYMPHOCYTES # BLD AUTO: 2.84 K/UL — SIGNIFICANT CHANGE UP (ref 1–3.3)
MCHC RBC-ENTMCNC: 28.5 PG — SIGNIFICANT CHANGE UP (ref 27–34)
MCHC RBC-ENTMCNC: 31.9 % — LOW (ref 32–36)
MCV RBC AUTO: 89.5 FL — SIGNIFICANT CHANGE UP (ref 80–100)
MONOCYTES # BLD AUTO: 1.68 K/UL — HIGH (ref 0–0.9)
MONOCYTES NFR BLD AUTO: 11.3 % — SIGNIFICANT CHANGE UP (ref 2–14)
NEUTROPHILS # BLD AUTO: 9.75 K/UL — HIGH (ref 1.8–7.4)
NEUTROPHILS NFR BLD AUTO: 65.7 % — SIGNIFICANT CHANGE UP (ref 43–77)
NRBC # FLD: 0 K/UL — SIGNIFICANT CHANGE UP (ref 0–0)
PLATELET # BLD AUTO: 250 K/UL — SIGNIFICANT CHANGE UP (ref 150–400)
PMV BLD: 11.1 FL — SIGNIFICANT CHANGE UP (ref 7–13)
RBC # BLD: 3.33 M/UL — LOW (ref 3.8–5.2)
RBC # FLD: 16.5 % — HIGH (ref 10.3–14.5)
T PALLIDUM AB TITR SER: NEGATIVE — SIGNIFICANT CHANGE UP
WBC # BLD: 14.83 K/UL — HIGH (ref 3.8–10.5)
WBC # FLD AUTO: 14.83 K/UL — HIGH (ref 3.8–10.5)

## 2020-10-12 RX ORDER — SENNA PLUS 8.6 MG/1
1 TABLET ORAL
Refills: 0 | Status: DISCONTINUED | OUTPATIENT
Start: 2020-10-12 | End: 2020-10-14

## 2020-10-12 RX ORDER — FERROUS SULFATE 325(65) MG
325 TABLET ORAL DAILY
Refills: 0 | Status: DISCONTINUED | OUTPATIENT
Start: 2020-10-12 | End: 2020-10-14

## 2020-10-12 RX ORDER — IBUPROFEN 200 MG
600 TABLET ORAL EVERY 6 HOURS
Refills: 0 | Status: DISCONTINUED | OUTPATIENT
Start: 2020-10-12 | End: 2020-10-14

## 2020-10-12 RX ADMIN — FAMOTIDINE 20 MILLIGRAM(S): 10 INJECTION INTRAVENOUS at 11:08

## 2020-10-12 RX ADMIN — HEPARIN SODIUM 5000 UNIT(S): 5000 INJECTION INTRAVENOUS; SUBCUTANEOUS at 06:39

## 2020-10-12 RX ADMIN — Medication 600 MILLIGRAM(S): at 06:39

## 2020-10-12 RX ADMIN — Medication 600 MILLIGRAM(S): at 13:14

## 2020-10-12 RX ADMIN — Medication 975 MILLIGRAM(S): at 20:44

## 2020-10-12 RX ADMIN — Medication 975 MILLIGRAM(S): at 11:06

## 2020-10-12 RX ADMIN — Medication 30 MILLIGRAM(S): at 01:45

## 2020-10-12 RX ADMIN — OXYCODONE HYDROCHLORIDE 5 MILLIGRAM(S): 5 TABLET ORAL at 21:45

## 2020-10-12 RX ADMIN — OXYCODONE HYDROCHLORIDE 5 MILLIGRAM(S): 5 TABLET ORAL at 07:00

## 2020-10-12 RX ADMIN — OXYCODONE HYDROCHLORIDE 5 MILLIGRAM(S): 5 TABLET ORAL at 06:39

## 2020-10-12 RX ADMIN — Medication 600 MILLIGRAM(S): at 18:48

## 2020-10-12 RX ADMIN — SERTRALINE 50 MILLIGRAM(S): 25 TABLET, FILM COATED ORAL at 11:07

## 2020-10-12 RX ADMIN — Medication 975 MILLIGRAM(S): at 05:00

## 2020-10-12 RX ADMIN — SENNA PLUS 1 TABLET(S): 8.6 TABLET ORAL at 15:59

## 2020-10-12 RX ADMIN — OXYCODONE HYDROCHLORIDE 5 MILLIGRAM(S): 5 TABLET ORAL at 16:30

## 2020-10-12 RX ADMIN — OXYCODONE HYDROCHLORIDE 5 MILLIGRAM(S): 5 TABLET ORAL at 09:43

## 2020-10-12 RX ADMIN — Medication 975 MILLIGRAM(S): at 11:40

## 2020-10-12 RX ADMIN — Medication 600 MILLIGRAM(S): at 13:45

## 2020-10-12 RX ADMIN — Medication 30 MILLIGRAM(S): at 02:00

## 2020-10-12 RX ADMIN — OXYCODONE HYDROCHLORIDE 5 MILLIGRAM(S): 5 TABLET ORAL at 03:00

## 2020-10-12 RX ADMIN — Medication 975 MILLIGRAM(S): at 21:45

## 2020-10-12 RX ADMIN — OXYCODONE HYDROCHLORIDE 5 MILLIGRAM(S): 5 TABLET ORAL at 02:49

## 2020-10-12 RX ADMIN — OXYCODONE HYDROCHLORIDE 5 MILLIGRAM(S): 5 TABLET ORAL at 15:59

## 2020-10-12 RX ADMIN — Medication 600 MILLIGRAM(S): at 19:40

## 2020-10-12 RX ADMIN — HEPARIN SODIUM 5000 UNIT(S): 5000 INJECTION INTRAVENOUS; SUBCUTANEOUS at 18:42

## 2020-10-12 RX ADMIN — Medication 975 MILLIGRAM(S): at 04:46

## 2020-10-12 RX ADMIN — Medication 600 MILLIGRAM(S): at 19:44

## 2020-10-12 RX ADMIN — OXYCODONE HYDROCHLORIDE 5 MILLIGRAM(S): 5 TABLET ORAL at 20:43

## 2020-10-12 RX ADMIN — OXYCODONE HYDROCHLORIDE 5 MILLIGRAM(S): 5 TABLET ORAL at 10:15

## 2020-10-12 NOTE — PROGRESS NOTE ADULT - SUBJECTIVE AND OBJECTIVE BOX
ANESTHESIA POSTOP CHECK    34y Female POSTOP DAY 1     Vital Signs Last 24 Hrs  T(C): 36.7 (12 Oct 2020 05:32), Max: 37.1 (11 Oct 2020 18:51)  T(F): 98.1 (12 Oct 2020 05:32), Max: 98.8 (11 Oct 2020 18:51)  HR: 65 (12 Oct 2020 05:32) (60 - 80)  BP: 109/64 (12 Oct 2020 05:32) (93/56 - 121/65)  BP(mean): --  RR: 17 (12 Oct 2020 05:32) (16 - 18)  SpO2: 98% (12 Oct 2020 05:32) (98% - 100%)  I&O's Summary    11 Oct 2020 07:01  -  12 Oct 2020 07:00  --------------------------------------------------------  IN: 650 mL / OUT: 2275 mL / NET: -1625 mL        [X ] NO APPARENT ANESTHESIA COMPLICATIONS      Comments:

## 2020-10-12 NOTE — PROGRESS NOTE ADULT - PROBLEM SELECTOR PLAN 1
- Continue with po analgesia  - Increase ambulation  - Continue regular diet  - d/c IV fluids  - Check CBC  - D/c Lowry  - Incision dressing removed    Lesli Martinez, PGY-1

## 2020-10-12 NOTE — PROGRESS NOTE ADULT - SUBJECTIVE AND OBJECTIVE BOX
Patient seen and examined at bedside, no acute overnight events. No acute complaints, pain well controlled. Patient is ambulating and tolerating regular diet. Has not yet passed flatus. Lowry is still in place. Denies CP, SOB, N/V, HA, blurred vision, epigastric pain.    Vital Signs Last 24 Hours  T(C): 36.7 (10-12-20 @ 05:32), Max: 37.1 (10-11-20 @ 18:51)  HR: 65 (10-12-20 @ 05:32) (60 - 80)  BP: 109/64 (10-12-20 @ 05:32) (93/56 - 121/65)  RR: 17 (10-12-20 @ 05:32) (16 - 18)  SpO2: 98% (10-12-20 @ 05:32) (97% - 100%)    I&O's Summary    11 Oct 2020 07:01  -  12 Oct 2020 07:00  --------------------------------------------------------  IN: 650 mL / OUT: 2275 mL / NET: -1625 mL        Physical Exam:  General: NAD  Abdomen: Soft, non-tender, non-distended, fundus firm  Incision: Pfannenstiel incision CDI, subcuticular suture closure  Pelvic: Lochia wnl    Labs:    Blood Type: O Positive  Antibody Screen: --  RPR: Negative               9.5    14.83 )-----------( 250      ( 10-12 @ 05:26 )             29.8                11.4   17.49 )-----------( 268      ( 10-11 @ 01:40 )             36.8                10.4   12.53 )-----------( 264      ( 09-21 @ 15:40 )             31.5         MEDICATIONS  (STANDING):  acetaminophen   Tablet .. 975 milliGRAM(s) Oral <User Schedule>  citric acid/sodium citrate Solution 15 milliLiter(s) Oral once  diphtheria/tetanus/pertussis (acellular) Vaccine (ADAcel) 0.5 milliLiter(s) IntraMuscular once  famotidine    Tablet 20 milliGRAM(s) Oral daily  heparin   Injectable 5000 Unit(s) SubCutaneous every 12 hours  ibuprofen  Tablet. 600 milliGRAM(s) Oral every 6 hours  influenza   Vaccine 0.5 milliLiter(s) IntraMuscular once  oxytocin Infusion 333.333 milliUNIT(s)/Min (1000 mL/Hr) IV Continuous <Continuous>  oxytocin Infusion 333.333 milliUNIT(s)/Min (1000 mL/Hr) IV Continuous <Continuous>  prenatal multivitamin 1 Tablet(s) Oral daily  sertraline 50 milliGRAM(s) Oral daily    MEDICATIONS  (PRN):  diphenhydrAMINE 25 milliGRAM(s) Oral every 6 hours PRN Itching  lanolin Ointment 1 Application(s) Topical every 6 hours PRN Sore Nipples  magnesium hydroxide Suspension 30 milliLiter(s) Oral two times a day PRN Constipation  oxyCODONE    IR 5 milliGRAM(s) Oral every 3 hours PRN Moderate to Severe Pain (4-10)  oxyCODONE    IR 5 milliGRAM(s) Oral once PRN Moderate to Severe Pain (4-10)  simethicone 80 milliGRAM(s) Chew every 4 hours PRN Gas

## 2020-10-12 NOTE — CHART NOTE - NSCHARTNOTEFT_GEN_A_CORE
Patient seen and examined at bedside, no acute events overnight, Pain well controlled w/ PO analgesics. Patient is ambulating and tolerating regular diet. Has not yet passed flatus. Denies CP, SOB, n/v, HA, blurry vision or epigastric pain.      Vital Signs Last 24 Hrs  T(C): 36.7 (12 Oct 2020 05:32), Max: 37.1 (11 Oct 2020 18:51)  T(F): 98.1 (12 Oct 2020 05:32), Max: 98.8 (11 Oct 2020 18:51)  HR: 65 (12 Oct 2020 05:32) (60 - 80)  BP: 109/64 (12 Oct 2020 05:32) (93/56 - 121/65)  BP(mean): 79 (11 Oct 2020 09:00) (79 - 79)  RR: 17 (12 Oct 2020 05:32) (16 - 18)  SpO2: 98% (12 Oct 2020 05:32) (98% - 100%)    Gen: A&O x 3, NAD  Chest: CTA B/L  Cardiac: S1,S2  RRR  Breast: Soft, nontender, nonengorged  Abdomen: +BS; soft; Nontender, nondistended; dressing removed incision C/D/I steri strips in place. **** Dressing has been removed*****  Gyn: minimal lochia   Extremities: Nontender, venodynes in place while in bed                          9.5    14.83 )-----------( 250      ( 12 Oct 2020 05:26 )             29.8       A/P: POD #1 s/p c/s   -Pain management as needed  -OOB and ambulate  - reviewed CBC   - f/u void  -Advance diet to regular  -Encourage breastfeeding   -d/w Dr Perez Roque AGNP-c  [phone] Patient seen and examined at bedside, no acute events overnight, Pain well controlled w/ PO analgesics. Patient is ambulating and tolerating regular diet. Has not yet passed flatus. Denies CP, SOB, n/v, HA, blurry vision or epigastric pain.      Vital Signs Last 24 Hrs  T(C): 36.7 (12 Oct 2020 05:32), Max: 37.1 (11 Oct 2020 18:51)  T(F): 98.1 (12 Oct 2020 05:32), Max: 98.8 (11 Oct 2020 18:51)  HR: 65 (12 Oct 2020 05:32) (60 - 80)  BP: 109/64 (12 Oct 2020 05:32) (93/56 - 121/65)  BP(mean): 79 (11 Oct 2020 09:00) (79 - 79)  RR: 17 (12 Oct 2020 05:32) (16 - 18)  SpO2: 98% (12 Oct 2020 05:32) (98% - 100%)    Gen: A&O x 3, NAD  Chest: CTA B/L  Cardiac: S1,S2  RRR  Breast: Soft, nontender, nonengorged  Abdomen: +BS; soft; Nontender, nondistended; dressing removed incision C/D/I steri strips in place. **** Dressing has been removed*****  Gyn: minimal lochia   Extremities: Nontender, venodynes in place while in bed                          9.5    14.83 )-----------( 250      ( 12 Oct 2020 05:26 )             29.8       A/P: POD #1 s/p c/s   -Pain management as needed  -OOB and ambulate  - reviewed CBC   - f/u void  -Advance diet to regular  -Encourage breastfeeding   - strict PPD prec d/w pt at length  -d/w Dr Perez Roque AGNP-c  [phone]

## 2020-10-12 NOTE — PROGRESS NOTE ADULT - SUBJECTIVE AND OBJECTIVE BOX
Postop Day  __1_ s/p   C- Section    THERAPY:  [ x ] Spinal morphine   [  ] Epidural morphine   [  ] IV PCA Hydromorphone 1 mg/ml    acetaminophen   Tablet .. 975 milliGRAM(s) Oral <User Schedule>  citric acid/sodium citrate Solution 15 milliLiter(s) Oral once  diphenhydrAMINE 25 milliGRAM(s) Oral every 6 hours PRN  diphtheria/tetanus/pertussis (acellular) Vaccine (ADAcel) 0.5 milliLiter(s) IntraMuscular once  famotidine    Tablet 20 milliGRAM(s) Oral daily  heparin   Injectable 5000 Unit(s) SubCutaneous every 12 hours  ibuprofen  Tablet. 600 milliGRAM(s) Oral every 6 hours  influenza   Vaccine 0.5 milliLiter(s) IntraMuscular once  lanolin Ointment 1 Application(s) Topical every 6 hours PRN  magnesium hydroxide Suspension 30 milliLiter(s) Oral two times a day PRN  oxyCODONE    IR 5 milliGRAM(s) Oral every 3 hours PRN  oxyCODONE    IR 5 milliGRAM(s) Oral once PRN  oxytocin Infusion 333.333 milliUNIT(s)/Min IV Continuous <Continuous>  oxytocin Infusion 333.333 milliUNIT(s)/Min IV Continuous <Continuous>  prenatal multivitamin 1 Tablet(s) Oral daily  sertraline 50 milliGRAM(s) Oral daily  simethicone 80 milliGRAM(s) Chew every 4 hours PRN      T(C): 36.7 (10-12-20 @ 05:32), Max: 37.1 (10-11-20 @ 18:51)  HR: 65 (10-12-20 @ 05:32) (60 - 80)  BP: 109/64 (10-12-20 @ 05:32) (93/56 - 121/65)  RR: 17 (10-12-20 @ 05:32) (16 - 18)  SpO2: 98% (10-12-20 @ 05:32) (98% - 100%)    Pain:   ______mild_____ at rest;  _____moderate______with activity    Sedation Score:	  [ x ] Alert	    [  ] Drowsy        [  ] Arousable	[  ] Asleep	[  ] Unresponsive    Side Effects:	  [  ] None	     [  ] Nausea        [ x ] Pruritus        [  ] Weakness   [  ] Numbness        ASSESSMENT/ PLAN  [ x] Side effects resolving      [ x  ] Patient made aware of PRN meds available     [ x] Discontinue & switch to PRN pain medications        [  ] Continue       Patient states lower extremities feel and move normally. No apparent anesthetic complications.

## 2020-10-13 RX ORDER — TETANUS TOXOID, REDUCED DIPHTHERIA TOXOID AND ACELLULAR PERTUSSIS VACCINE, ADSORBED 5; 2.5; 8; 8; 2.5 [IU]/.5ML; [IU]/.5ML; UG/.5ML; UG/.5ML; UG/.5ML
0.5 SUSPENSION INTRAMUSCULAR ONCE
Refills: 0 | Status: COMPLETED | OUTPATIENT
Start: 2020-10-13 | End: 2020-10-13

## 2020-10-13 RX ADMIN — OXYCODONE HYDROCHLORIDE 5 MILLIGRAM(S): 5 TABLET ORAL at 06:52

## 2020-10-13 RX ADMIN — OXYCODONE HYDROCHLORIDE 5 MILLIGRAM(S): 5 TABLET ORAL at 21:12

## 2020-10-13 RX ADMIN — Medication 600 MILLIGRAM(S): at 04:11

## 2020-10-13 RX ADMIN — Medication 600 MILLIGRAM(S): at 03:41

## 2020-10-13 RX ADMIN — OXYCODONE HYDROCHLORIDE 5 MILLIGRAM(S): 5 TABLET ORAL at 04:41

## 2020-10-13 RX ADMIN — HEPARIN SODIUM 5000 UNIT(S): 5000 INJECTION INTRAVENOUS; SUBCUTANEOUS at 06:52

## 2020-10-13 RX ADMIN — Medication 975 MILLIGRAM(S): at 13:24

## 2020-10-13 RX ADMIN — SIMETHICONE 80 MILLIGRAM(S): 80 TABLET, CHEWABLE ORAL at 13:26

## 2020-10-13 RX ADMIN — Medication 325 MILLIGRAM(S): at 09:54

## 2020-10-13 RX ADMIN — OXYCODONE HYDROCHLORIDE 5 MILLIGRAM(S): 5 TABLET ORAL at 22:05

## 2020-10-13 RX ADMIN — FAMOTIDINE 20 MILLIGRAM(S): 10 INJECTION INTRAVENOUS at 09:55

## 2020-10-13 RX ADMIN — OXYCODONE HYDROCHLORIDE 5 MILLIGRAM(S): 5 TABLET ORAL at 03:41

## 2020-10-13 RX ADMIN — OXYCODONE HYDROCHLORIDE 5 MILLIGRAM(S): 5 TABLET ORAL at 13:24

## 2020-10-13 RX ADMIN — Medication 600 MILLIGRAM(S): at 18:11

## 2020-10-13 RX ADMIN — Medication 975 MILLIGRAM(S): at 07:40

## 2020-10-13 RX ADMIN — SERTRALINE 50 MILLIGRAM(S): 25 TABLET, FILM COATED ORAL at 09:56

## 2020-10-13 RX ADMIN — Medication 975 MILLIGRAM(S): at 06:52

## 2020-10-13 RX ADMIN — Medication 600 MILLIGRAM(S): at 09:53

## 2020-10-13 RX ADMIN — Medication 600 MILLIGRAM(S): at 10:45

## 2020-10-13 RX ADMIN — OXYCODONE HYDROCHLORIDE 5 MILLIGRAM(S): 5 TABLET ORAL at 10:50

## 2020-10-13 RX ADMIN — OXYCODONE HYDROCHLORIDE 5 MILLIGRAM(S): 5 TABLET ORAL at 07:40

## 2020-10-13 RX ADMIN — OXYCODONE HYDROCHLORIDE 5 MILLIGRAM(S): 5 TABLET ORAL at 09:53

## 2020-10-13 RX ADMIN — TETANUS TOXOID, REDUCED DIPHTHERIA TOXOID AND ACELLULAR PERTUSSIS VACCINE, ADSORBED 0.5 MILLILITER(S): 5; 2.5; 8; 8; 2.5 SUSPENSION INTRAMUSCULAR at 03:41

## 2020-10-13 RX ADMIN — Medication 975 MILLIGRAM(S): at 20:26

## 2020-10-13 RX ADMIN — Medication 1 TABLET(S): at 09:56

## 2020-10-13 RX ADMIN — OXYCODONE HYDROCHLORIDE 5 MILLIGRAM(S): 5 TABLET ORAL at 18:11

## 2020-10-13 RX ADMIN — SIMETHICONE 80 MILLIGRAM(S): 80 TABLET, CHEWABLE ORAL at 06:59

## 2020-10-13 RX ADMIN — Medication 975 MILLIGRAM(S): at 21:10

## 2020-10-13 RX ADMIN — HEPARIN SODIUM 5000 UNIT(S): 5000 INJECTION INTRAVENOUS; SUBCUTANEOUS at 18:11

## 2020-10-13 NOTE — PROGRESS NOTE ADULT - ATTENDING COMMENTS
Patient seen at bedside. Agree with A & P above
Patient seen and examined at bedside. Patient reports 6/10 incisional pain. Patient reports that she has been ambulating a lot and may have over done it. She reports that she is passing flatus and tolerating a diet.  Abdomen: Soft, non distended, nontender. Dressing still in place - to be removed POD #2 10/13  GYN: Normal lochia    35 y/o POD #1 s/p R'C/S and bilateral salpingectomy doing well.   Pain management  Out of bed with ambulation  Continue postpartum care  Patient for discharge POD #3

## 2020-10-13 NOTE — PROGRESS NOTE ADULT - ASSESSMENT
PE:  Lung sounds clear bilaterally. Normal heart rhythm.   Breasts: soft, nontender  Nipples: intact  Abdomen: Soft, nondistended and nontender. Bowel sounds present. Fundus firm  Abdominal incision: Clean, dry and intact with steri strips.   Vaginal: Lochia light rubra  Extremities: No edema noted bilaterally and equal to lower extremities, nontender with no erythremic areas noted. Positive pedal pulses. No palpable veins noted  Other relevant physical exam findings: none          Plan  A/P: 34y      Day#2    repeat post-operative  section delivery for breech with bilateral salpingectomy.  Stable          Problem#1:  section delivery  Will continue to monitor intermittent headache. Encourage increase oral hydration and rest.   Continue routine post-operative and postpartum care  Increase ambulation, analgesia PRN and pain medication protocol of standing ibuprofen and acetaminophen and oxycodone prn  Encouraged to wear abdominal binder for support and to use incentive spirometer  Discussed abdominal incision care.  Discussed breast/nipple care for a nonbreastfeeding mother. .  Discussed discharge planning. PE:  Lung sounds clear bilaterally. Normal heart rhythm.   Breasts: soft, nontender  Nipples: intact  Abdomen: Soft, nondistended and nontender. Bowel sounds present. Fundus firm  Abdominal incision: Clean, dry and intact with steri strips.   Vaginal: Lochia light rubra  Extremities: No edema noted bilaterally and equal to lower extremities, nontender with no erythremic areas noted. Positive pedal pulses. No palpable veins noted  Other relevant physical exam findings: none          Plan  A/P: 34y      Day#2    repeat post-operative  section delivery for breech with bilateral salpingectomy.  Stable          Problem#1:  section delivery  Will continue to monitor intermittent headache. Encourage increase oral hydration and rest.   Discussed constipation relieving measures, interventions and medications.   Continue routine post-operative and postpartum care  Increase ambulation, analgesia PRN and pain medication protocol of standing ibuprofen and acetaminophen and oxycodone prn  Encouraged to wear abdominal binder for support and to use incentive spirometer  Discussed abdominal incision care.  Discussed breast/nipple care for a nonbreastfeeding mother. .  Discussed discharge planning.
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
## 2020-10-14 VITALS
OXYGEN SATURATION: 98 % | DIASTOLIC BLOOD PRESSURE: 84 MMHG | TEMPERATURE: 98 F | RESPIRATION RATE: 16 BRPM | HEART RATE: 58 BPM | SYSTOLIC BLOOD PRESSURE: 119 MMHG

## 2020-10-14 RX ADMIN — Medication 975 MILLIGRAM(S): at 09:14

## 2020-10-14 RX ADMIN — OXYCODONE HYDROCHLORIDE 5 MILLIGRAM(S): 5 TABLET ORAL at 04:20

## 2020-10-14 RX ADMIN — Medication 600 MILLIGRAM(S): at 01:16

## 2020-10-14 RX ADMIN — FAMOTIDINE 20 MILLIGRAM(S): 10 INJECTION INTRAVENOUS at 11:51

## 2020-10-14 RX ADMIN — OXYCODONE HYDROCHLORIDE 5 MILLIGRAM(S): 5 TABLET ORAL at 01:16

## 2020-10-14 RX ADMIN — SERTRALINE 50 MILLIGRAM(S): 25 TABLET, FILM COATED ORAL at 11:51

## 2020-10-14 RX ADMIN — HEPARIN SODIUM 5000 UNIT(S): 5000 INJECTION INTRAVENOUS; SUBCUTANEOUS at 06:41

## 2020-10-14 RX ADMIN — OXYCODONE HYDROCHLORIDE 5 MILLIGRAM(S): 5 TABLET ORAL at 00:25

## 2020-10-14 RX ADMIN — OXYCODONE HYDROCHLORIDE 5 MILLIGRAM(S): 5 TABLET ORAL at 06:45

## 2020-10-14 RX ADMIN — Medication 975 MILLIGRAM(S): at 03:19

## 2020-10-14 RX ADMIN — Medication 600 MILLIGRAM(S): at 11:51

## 2020-10-14 RX ADMIN — OXYCODONE HYDROCHLORIDE 5 MILLIGRAM(S): 5 TABLET ORAL at 03:19

## 2020-10-14 RX ADMIN — Medication 600 MILLIGRAM(S): at 06:45

## 2020-10-14 RX ADMIN — Medication 600 MILLIGRAM(S): at 00:24

## 2020-10-14 RX ADMIN — MAGNESIUM HYDROXIDE 30 MILLILITER(S): 400 TABLET, CHEWABLE ORAL at 12:01

## 2020-10-14 RX ADMIN — Medication 975 MILLIGRAM(S): at 04:20

## 2020-10-14 RX ADMIN — Medication 975 MILLIGRAM(S): at 10:14

## 2020-10-14 NOTE — PROGRESS NOTE ADULT - SUBJECTIVE AND OBJECTIVE BOX
Post-Operative Note, C/S  She is a  34y woman who is now post-operative day: 3    Subjective:  The patient feels well.  She is ambulating.   She is tolerating regular diet.  She denies nausea and vomiting; denies fever.  She is voiding.  Her pain is controlled; incisional pain is appropriate.  She reports normal postpartum bleeding.  She is breastfeeding and formula feeding.    Physical exam:    Vital Signs Last 24 Hrs  T(C): 36.5 (14 Oct 2020 10:24), Max: 37 (13 Oct 2020 21:44)  T(F): 97.7 (14 Oct 2020 10:24), Max: 98.6 (13 Oct 2020 21:44)  HR: 58 (14 Oct 2020 10:24) (47 - 71)  BP: 119/84 (14 Oct 2020 10:24) (109/67 - 133/81)  BP(mean): --  RR: 16 (14 Oct 2020 10:24) (16 - 18)  SpO2: 98% (14 Oct 2020 10:24) (98% - 100%)    Gen: NAD  Breast: Soft, nontender, not engorged.  Abdomen: Soft, nontender, no distension , firm uterine fundus at umbilicus.  Incision: C/D/I.  Pelvic: Normal lochia noted  Ext: No calf tenderness      Allergies    harsha (Unknown)  No Known Drug Allergies      MEDICATIONS  (STANDING):  acetaminophen   Tablet .. 975 milliGRAM(s) Oral <User Schedule>  citric acid/sodium citrate Solution 15 milliLiter(s) Oral once  famotidine    Tablet 20 milliGRAM(s) Oral daily  ferrous    sulfate 325 milliGRAM(s) Oral daily  heparin   Injectable 5000 Unit(s) SubCutaneous every 12 hours  ibuprofen  Tablet. 600 milliGRAM(s) Oral every 6 hours  prenatal multivitamin 1 Tablet(s) Oral daily  sertraline 50 milliGRAM(s) Oral daily    MEDICATIONS  (PRN):  diphenhydrAMINE 25 milliGRAM(s) Oral every 6 hours PRN Itching  lanolin Ointment 1 Application(s) Topical every 6 hours PRN Sore Nipples  magnesium hydroxide Suspension 30 milliLiter(s) Oral two times a day PRN Constipation  oxyCODONE    IR 5 milliGRAM(s) Oral once PRN Moderate to Severe Pain (4-10)  oxyCODONE    IR 5 milliGRAM(s) Oral every 3 hours PRN Moderate to Severe Pain (4-10)  senna 1 Tablet(s) Oral two times a day PRN Constipation  simethicone 80 milliGRAM(s) Chew every 4 hours PRN Gas

## 2020-10-14 NOTE — PROGRESS NOTE ADULT - ASSESSMENT
Assessment and Plan  POD #3 s/p C/S.  Doing well and bonding with baby.  Encourage ambulation.  Incisional care and PO instructions reviewed.  CPC.  discharge pt home today  RTO 2 weeks for post op visit/PRN

## 2020-10-19 ENCOUNTER — NON-APPOINTMENT (OUTPATIENT)
Age: 35
End: 2020-10-19

## 2020-10-21 LAB — SURGICAL PATHOLOGY STUDY: SIGNIFICANT CHANGE UP

## 2021-02-04 ENCOUNTER — TRANSCRIPTION ENCOUNTER (OUTPATIENT)
Age: 36
End: 2021-02-04

## 2021-03-04 ENCOUNTER — EMERGENCY (EMERGENCY)
Facility: HOSPITAL | Age: 36
LOS: 1 days | Discharge: HOME | End: 2021-03-04
Attending: EMERGENCY MEDICINE | Admitting: EMERGENCY MEDICINE
Payer: COMMERCIAL

## 2021-03-04 VITALS
OXYGEN SATURATION: 100 % | HEIGHT: 65 IN | SYSTOLIC BLOOD PRESSURE: 132 MMHG | DIASTOLIC BLOOD PRESSURE: 87 MMHG | WEIGHT: 197.09 LBS | HEART RATE: 64 BPM | TEMPERATURE: 98 F | RESPIRATION RATE: 14 BRPM

## 2021-03-04 DIAGNOSIS — Z98.84 BARIATRIC SURGERY STATUS: Chronic | ICD-10-CM

## 2021-03-04 DIAGNOSIS — Z90.49 ACQUIRED ABSENCE OF OTHER SPECIFIED PARTS OF DIGESTIVE TRACT: Chronic | ICD-10-CM

## 2021-03-04 DIAGNOSIS — Z98.890 OTHER SPECIFIED POSTPROCEDURAL STATES: Chronic | ICD-10-CM

## 2021-03-04 LAB
ALBUMIN SERPL ELPH-MCNC: 3.7 G/DL — SIGNIFICANT CHANGE UP (ref 3.3–5)
ALP SERPL-CCNC: 79 U/L — SIGNIFICANT CHANGE UP (ref 40–120)
ALT FLD-CCNC: 23 U/L — SIGNIFICANT CHANGE UP (ref 10–45)
ANION GAP SERPL CALC-SCNC: 4 MMOL/L — LOW (ref 5–17)
AST SERPL-CCNC: 16 U/L — SIGNIFICANT CHANGE UP (ref 10–40)
BASOPHILS # BLD AUTO: 0.07 K/UL — SIGNIFICANT CHANGE UP (ref 0–0.2)
BASOPHILS NFR BLD AUTO: 0.8 % — SIGNIFICANT CHANGE UP (ref 0–2)
BILIRUB SERPL-MCNC: 0.3 MG/DL — SIGNIFICANT CHANGE UP (ref 0.2–1.2)
BUN SERPL-MCNC: 12 MG/DL — SIGNIFICANT CHANGE UP (ref 7–23)
CALCIUM SERPL-MCNC: 9.1 MG/DL — SIGNIFICANT CHANGE UP (ref 8.4–10.5)
CHLORIDE SERPL-SCNC: 103 MMOL/L — SIGNIFICANT CHANGE UP (ref 96–108)
CO2 SERPL-SCNC: 29 MMOL/L — SIGNIFICANT CHANGE UP (ref 22–31)
CREAT SERPL-MCNC: 0.9 MG/DL — SIGNIFICANT CHANGE UP (ref 0.5–1.3)
EOSINOPHIL # BLD AUTO: 0.19 K/UL — SIGNIFICANT CHANGE UP (ref 0–0.5)
EOSINOPHIL NFR BLD AUTO: 2.2 % — SIGNIFICANT CHANGE UP (ref 0–6)
GLUCOSE SERPL-MCNC: 83 MG/DL — SIGNIFICANT CHANGE UP (ref 70–99)
HCT VFR BLD CALC: 40 % — SIGNIFICANT CHANGE UP (ref 34.5–45)
HGB BLD-MCNC: 13.1 G/DL — SIGNIFICANT CHANGE UP (ref 11.5–15.5)
IMM GRANULOCYTES NFR BLD AUTO: 0.6 % — SIGNIFICANT CHANGE UP (ref 0–1.5)
LYMPHOCYTES # BLD AUTO: 3.42 K/UL — HIGH (ref 1–3.3)
LYMPHOCYTES # BLD AUTO: 40.4 % — SIGNIFICANT CHANGE UP (ref 13–44)
MCHC RBC-ENTMCNC: 29.3 PG — SIGNIFICANT CHANGE UP (ref 27–34)
MCHC RBC-ENTMCNC: 32.8 GM/DL — SIGNIFICANT CHANGE UP (ref 32–36)
MCV RBC AUTO: 89.5 FL — SIGNIFICANT CHANGE UP (ref 80–100)
MONOCYTES # BLD AUTO: 0.74 K/UL — SIGNIFICANT CHANGE UP (ref 0–0.9)
MONOCYTES NFR BLD AUTO: 8.7 % — SIGNIFICANT CHANGE UP (ref 2–14)
NEUTROPHILS # BLD AUTO: 4 K/UL — SIGNIFICANT CHANGE UP (ref 1.8–7.4)
NEUTROPHILS NFR BLD AUTO: 47.3 % — SIGNIFICANT CHANGE UP (ref 43–77)
NRBC # BLD: 0 /100 WBCS — SIGNIFICANT CHANGE UP (ref 0–0)
PLATELET # BLD AUTO: 323 K/UL — SIGNIFICANT CHANGE UP (ref 150–400)
POTASSIUM SERPL-MCNC: 4.2 MMOL/L — SIGNIFICANT CHANGE UP (ref 3.5–5.3)
POTASSIUM SERPL-SCNC: 4.2 MMOL/L — SIGNIFICANT CHANGE UP (ref 3.5–5.3)
PROT SERPL-MCNC: 7.3 G/DL — SIGNIFICANT CHANGE UP (ref 6–8.3)
RBC # BLD: 4.47 M/UL — SIGNIFICANT CHANGE UP (ref 3.8–5.2)
RBC # FLD: 12.8 % — SIGNIFICANT CHANGE UP (ref 10.3–14.5)
SODIUM SERPL-SCNC: 136 MMOL/L — SIGNIFICANT CHANGE UP (ref 135–145)
WBC # BLD: 8.47 K/UL — SIGNIFICANT CHANGE UP (ref 3.8–10.5)
WBC # FLD AUTO: 8.47 K/UL — SIGNIFICANT CHANGE UP (ref 3.8–10.5)

## 2021-03-04 PROCEDURE — 70496 CT ANGIOGRAPHY HEAD: CPT

## 2021-03-04 PROCEDURE — 99284 EMERGENCY DEPT VISIT MOD MDM: CPT | Mod: 25

## 2021-03-04 PROCEDURE — 70496 CT ANGIOGRAPHY HEAD: CPT | Mod: 26,MA

## 2021-03-04 PROCEDURE — 96374 THER/PROPH/DIAG INJ IV PUSH: CPT

## 2021-03-04 PROCEDURE — 85025 COMPLETE CBC W/AUTO DIFF WBC: CPT

## 2021-03-04 PROCEDURE — 70498 CT ANGIOGRAPHY NECK: CPT

## 2021-03-04 PROCEDURE — 99285 EMERGENCY DEPT VISIT HI MDM: CPT

## 2021-03-04 PROCEDURE — 70498 CT ANGIOGRAPHY NECK: CPT | Mod: 26,MA

## 2021-03-04 PROCEDURE — 80053 COMPREHEN METABOLIC PANEL: CPT

## 2021-03-04 PROCEDURE — 36415 COLL VENOUS BLD VENIPUNCTURE: CPT

## 2021-03-04 RX ORDER — ACETAMINOPHEN 500 MG
975 TABLET ORAL ONCE
Refills: 0 | Status: COMPLETED | OUTPATIENT
Start: 2021-03-04 | End: 2021-03-04

## 2021-03-04 RX ORDER — METOCLOPRAMIDE HCL 10 MG
10 TABLET ORAL ONCE
Refills: 0 | Status: COMPLETED | OUTPATIENT
Start: 2021-03-04 | End: 2021-03-04

## 2021-03-04 RX ORDER — MECLIZINE HCL 12.5 MG
1 TABLET ORAL
Qty: 15 | Refills: 0
Start: 2021-03-04 | End: 2021-03-08

## 2021-03-04 RX ORDER — SODIUM CHLORIDE 9 MG/ML
1000 INJECTION INTRAMUSCULAR; INTRAVENOUS; SUBCUTANEOUS ONCE
Refills: 0 | Status: COMPLETED | OUTPATIENT
Start: 2021-03-04 | End: 2021-03-04

## 2021-03-04 RX ORDER — MECLIZINE HCL 12.5 MG
25 TABLET ORAL ONCE
Refills: 0 | Status: COMPLETED | OUTPATIENT
Start: 2021-03-04 | End: 2021-03-04

## 2021-03-04 RX ADMIN — Medication 975 MILLIGRAM(S): at 20:54

## 2021-03-04 RX ADMIN — SODIUM CHLORIDE 1000 MILLILITER(S): 9 INJECTION INTRAMUSCULAR; INTRAVENOUS; SUBCUTANEOUS at 20:54

## 2021-03-04 RX ADMIN — Medication 25 MILLIGRAM(S): at 20:53

## 2021-03-04 RX ADMIN — Medication 10 MILLIGRAM(S): at 20:53

## 2021-03-04 NOTE — ED PROVIDER NOTE - CLINICAL SUMMARY MEDICAL DECISION MAKING FREE TEXT BOX
34 yo F with PMHx anemia s/p recent iron infusions after  4 months ago presents c/o HA, dizziness, nausea and fatigue x1 month.  Pt went to urgent care on , was given meclizine which she took with some relief.  Pt thought her sx were 2/2 anemia, saw her hematologist today, had labs drawn with normal H/H.  Pt states she came to the ED today because HA worsened.  Pt states she has fallen twice because of the dizziness.  Pt denies visual changes, fevers, CP, SOB.  WIll obtain CTA head and neck, labs, give IVF, tylenol, meclizine and reassess Cherie: 36 yo F with PMHx anemia s/p recent iron infusions after  4 months ago presents c/o HA, dizziness, nausea and fatigue x1 month.  Pt went to urgent care on , was given meclizine which she took with some relief.  Pt thought her sx were 2/2 anemia, saw her hematologist today, had labs drawn with normal H/H.  Pt states she came to the ED today because HA worsened.  Pt states she has fallen twice because of the dizziness.  Pt denies visual changes, fevers, CP, SOB.  WIll obtain CTA head and neck, labs, give IVF, tylenol, meclizine and reassess

## 2021-03-04 NOTE — ED PROVIDER NOTE - CARE PROVIDER_API CALL
Dimas Nunn (MD)  Neurology  [address], [address]  [place], [place] [id number]  Phone: [phone]  Fax: [phone]  Follow Up Time:

## 2021-03-04 NOTE — ED ADULT NURSE NOTE - CHIEF COMPLAINT QUOTE
dizziness X 2 weeks, worsening for the last week with headache and nausea. Was seen at urgent care and given Meclizine with some relief. Pt delivered 3rd child via  4 months ago.

## 2021-03-04 NOTE — ED ADULT NURSE NOTE - OBJECTIVE STATEMENT
the pt states she has had dizziness for 2 months. she was seen at urgent care recently and given meclazine without any effect.

## 2021-03-04 NOTE — ED PROVIDER NOTE - NEUROLOGICAL, MLM
GCS 15. Pt alert, NAD, ambulates with ease in ED. normocephalic atraumatic. no spinal tenderness. CN II-XII grossly intact. neg romberg, neg pronator drift, normal gait including tandem. pupils PERRL 4mm bilat, EOMI. no gross visual field deficit. no dysmetria, neg finger to nose, heel to shin.

## 2021-03-04 NOTE — ED PROVIDER NOTE - OBJECTIVE STATEMENT
34 yo F with PMHx anemia s/p recent iron infusions after  4 months ago presents c/o HA, dizziness, nausea and fatigue x1 month.  Pt went to urgent care on , was given meclizine which she took with some relief.  Pt thought her sx were 2/2 anemia, saw her hematologist today, had labs drawn with normal H/H.  Pt states she came to the ED today because HA worsened.  Pt states she has fallen twice because of the dizziness.  Pt denies visual changes, fevers, CP, SOB.

## 2021-03-04 NOTE — ED PROVIDER NOTE - PROGRESS NOTE DETAILS
pt states that overall she is feeling much better, dizziness has resolved and headache is almost resolved.  will follow up with neurology.  CTA head/neck normal.

## 2021-03-04 NOTE — ED PROVIDER NOTE - PATIENT PORTAL LINK FT
You can access the FollowMyHealth Patient Portal offered by [facility] by registering at the following website: [email or web address]. By joining [facility]’s FollowMyHealth portal, you will also be able to view your health information using other applications (apps) compatible with our system.

## 2021-03-04 NOTE — ED PROVIDER NOTE - NEURO NEGATIVE STATEMENT, MLM
no loss of consciousness, no gait abnormality, +dizziness, +headache, no sensory deficits, and no weakness.

## 2021-03-04 NOTE — ED ADULT NURSE NOTE - NSIMPLEMENTINTERV_GEN_ALL_ED
Implemented All Universal Safety Interventions:  [place] to call system. Call bell, personal items and telephone within reach. Instruct patient to call for assistance. Room bathroom lighting operational. Non-slip footwear when patient is off stretcher. Physically safe environment: no spills, clutter or unnecessary equipment. Stretcher in lowest position, wheels locked, appropriate side rails in place.

## 2021-03-04 NOTE — ED ADULT TRIAGE NOTE - CHIEF COMPLAINT QUOTE
dizziness X 2 weeks, worsening for the last week with headache and nausea. Was seen at urgent care and given Meclizine with some relief. Pt delivered 3rd child via  4 weeks ago. dizziness X 2 weeks, worsening for the last week with headache and nausea. Was seen at urgent care and given Meclizine with some relief. Pt delivered 3rd child via  4 months ago.

## 2021-03-09 NOTE — PATIENT PROFILE ADULT. - NS TRANSFER DISPOSITION PATIENT BELONGINGS
with patient Partially impaired: cannot see medication labels or newsprint, but can see obstacles in path, and the surrounding layout; can count fingers at arm's length

## 2021-03-16 DIAGNOSIS — F41.8 OTHER SPECIFIED ANXIETY DISORDERS: ICD-10-CM

## 2021-03-16 DIAGNOSIS — N96 RECURRENT PREGNANCY LOSS: ICD-10-CM

## 2021-03-16 DIAGNOSIS — D50.9 IRON DEFICIENCY ANEMIA, UNSPECIFIED: ICD-10-CM

## 2021-03-16 DIAGNOSIS — E53.8 DEFICIENCY OF OTHER SPECIFIED B GROUP VITAMINS: ICD-10-CM

## 2021-03-19 ENCOUNTER — APPOINTMENT (OUTPATIENT)
Dept: HEMATOLOGY ONCOLOGY | Facility: CLINIC | Age: 36
End: 2021-03-19

## 2021-03-19 DIAGNOSIS — O24.419 GESTATIONAL DIABETES MELLITUS IN PREGNANCY, UNSPECIFIED CONTROL: ICD-10-CM

## 2021-03-24 ENCOUNTER — NON-APPOINTMENT (OUTPATIENT)
Age: 36
End: 2021-03-24

## 2021-03-25 PROBLEM — D64.9 ANEMIA, UNSPECIFIED: Chronic | Status: ACTIVE | Noted: 2021-03-04

## 2021-03-30 ENCOUNTER — APPOINTMENT (OUTPATIENT)
Dept: SURGERY | Facility: CLINIC | Age: 36
End: 2021-03-30
Payer: COMMERCIAL

## 2021-03-30 ENCOUNTER — NON-APPOINTMENT (OUTPATIENT)
Age: 36
End: 2021-03-30

## 2021-03-30 ENCOUNTER — LABORATORY RESULT (OUTPATIENT)
Age: 36
End: 2021-03-30

## 2021-03-30 VITALS
WEIGHT: 197 LBS | BODY MASS INDEX: 32.82 KG/M2 | HEIGHT: 65 IN | SYSTOLIC BLOOD PRESSURE: 124 MMHG | HEART RATE: 67 BPM | DIASTOLIC BLOOD PRESSURE: 69 MMHG

## 2021-03-30 DIAGNOSIS — Z78.9 OTHER SPECIFIED HEALTH STATUS: ICD-10-CM

## 2021-03-30 DIAGNOSIS — F17.210 NICOTINE DEPENDENCE, CIGARETTES, UNCOMPLICATED: ICD-10-CM

## 2021-03-30 PROCEDURE — 99204 OFFICE O/P NEW MOD 45 MIN: CPT

## 2021-03-30 PROCEDURE — 99072 ADDL SUPL MATRL&STAF TM PHE: CPT

## 2021-03-30 PROCEDURE — 10021 FNA BX W/O IMG GDN 1ST LES: CPT

## 2021-03-30 NOTE — ASSESSMENT
[FreeTextEntry1] : fine needle aspiration cytology performed. MRI requested. to call next week for results. patient has been given the opportunity to ask questions, and all of the patient's questions have been answered to their satisfaction\par

## 2021-03-30 NOTE — HISTORY OF PRESENT ILLNESS
[de-identified] : Pt c/o left parotid mass found on MRI for headaches. Pt states she noticed it 2015 and has increased in size and notes dry mouth.  denies pain or infection, fever, night sweats, skin cancer excision, dysphagia, hoarseness or RT Exposure. \par MRI :  left superficial parotid mass 2.3 cm incompletely visualized\par I have reviewed all old and new data and available images.\par

## 2021-03-30 NOTE — CONSULT LETTER
[Dear  ___] : Dear  [unfilled], [Consult Letter:] : I had the pleasure of evaluating your patient, [unfilled]. [Please see my note below.] : Please see my note below. [Consult Closing:] : Thank you very much for allowing me to participate in the care of this patient.  If you have any questions, please do not hesitate to contact me. [Sincerely,] : Sincerely, [FreeTextEntry2] : Dr. Aquiles Figueroa, Dr. Haleh Mohseni [FreeTextEntry3] : Philip Reeves MD, FACS\par System Director, Endocrine Surgery\par [facility]\par Assistant Clinical Professor of Surgery\par [employer] [facility] [employer] [facility]  [DrEse  ___] : Dr. OHARA

## 2021-03-31 ENCOUNTER — OUTPATIENT (OUTPATIENT)
Dept: OUTPATIENT SERVICES | Facility: HOSPITAL | Age: 36
LOS: 1 days | End: 2021-03-31

## 2021-03-31 ENCOUNTER — APPOINTMENT (OUTPATIENT)
Dept: MRI IMAGING | Facility: CLINIC | Age: 36
End: 2021-03-31
Payer: COMMERCIAL

## 2021-03-31 DIAGNOSIS — K11.8 OTHER DISEASES OF SALIVARY GLANDS: ICD-10-CM

## 2021-03-31 DIAGNOSIS — Z98.890 OTHER SPECIFIED POSTPROCEDURAL STATES: Chronic | ICD-10-CM

## 2021-03-31 DIAGNOSIS — Z90.49 ACQUIRED ABSENCE OF OTHER SPECIFIED PARTS OF DIGESTIVE TRACT: Chronic | ICD-10-CM

## 2021-03-31 DIAGNOSIS — Z98.84 BARIATRIC SURGERY STATUS: Chronic | ICD-10-CM

## 2021-03-31 PROCEDURE — 70543 MRI ORBT/FAC/NCK W/O &W/DYE: CPT | Mod: 26

## 2021-04-05 ENCOUNTER — NON-APPOINTMENT (OUTPATIENT)
Age: 36
End: 2021-04-05

## 2021-04-08 ENCOUNTER — TRANSCRIPTION ENCOUNTER (OUTPATIENT)
Age: 36
End: 2021-04-08

## 2021-04-30 ENCOUNTER — OUTPATIENT (OUTPATIENT)
Dept: OUTPATIENT SERVICES | Facility: HOSPITAL | Age: 36
LOS: 1 days | End: 2021-04-30

## 2021-04-30 VITALS
HEART RATE: 85 BPM | RESPIRATION RATE: 16 BRPM | WEIGHT: 190.04 LBS | TEMPERATURE: 98 F | HEIGHT: 63 IN | SYSTOLIC BLOOD PRESSURE: 130 MMHG | OXYGEN SATURATION: 99 % | DIASTOLIC BLOOD PRESSURE: 80 MMHG

## 2021-04-30 DIAGNOSIS — Z01.818 ENCOUNTER FOR OTHER PREPROCEDURAL EXAMINATION: ICD-10-CM

## 2021-04-30 DIAGNOSIS — Z98.890 OTHER SPECIFIED POSTPROCEDURAL STATES: Chronic | ICD-10-CM

## 2021-04-30 DIAGNOSIS — Z98.84 BARIATRIC SURGERY STATUS: Chronic | ICD-10-CM

## 2021-04-30 DIAGNOSIS — Z90.49 ACQUIRED ABSENCE OF OTHER SPECIFIED PARTS OF DIGESTIVE TRACT: Chronic | ICD-10-CM

## 2021-04-30 DIAGNOSIS — Z98.51 TUBAL LIGATION STATUS: Chronic | ICD-10-CM

## 2021-04-30 DIAGNOSIS — K11.9 DISEASE OF SALIVARY GLAND, UNSPECIFIED: ICD-10-CM

## 2021-04-30 LAB
ANION GAP SERPL CALC-SCNC: 15 MMOL/L — HIGH (ref 7–14)
BUN SERPL-MCNC: 17 MG/DL — SIGNIFICANT CHANGE UP (ref 7–23)
CALCIUM SERPL-MCNC: 9.7 MG/DL — SIGNIFICANT CHANGE UP (ref 8.4–10.5)
CHLORIDE SERPL-SCNC: 102 MMOL/L — SIGNIFICANT CHANGE UP (ref 98–107)
CO2 SERPL-SCNC: 21 MMOL/L — LOW (ref 22–31)
CREAT SERPL-MCNC: 0.61 MG/DL — SIGNIFICANT CHANGE UP (ref 0.5–1.3)
GLUCOSE SERPL-MCNC: 71 MG/DL — SIGNIFICANT CHANGE UP (ref 70–99)
HCG UR QL: NEGATIVE — SIGNIFICANT CHANGE UP
HCT VFR BLD CALC: 38.4 % — SIGNIFICANT CHANGE UP (ref 34.5–45)
HGB BLD-MCNC: 12.7 G/DL — SIGNIFICANT CHANGE UP (ref 11.5–15.5)
MCHC RBC-ENTMCNC: 29.1 PG — SIGNIFICANT CHANGE UP (ref 27–34)
MCHC RBC-ENTMCNC: 33.1 GM/DL — SIGNIFICANT CHANGE UP (ref 32–36)
MCV RBC AUTO: 88.1 FL — SIGNIFICANT CHANGE UP (ref 80–100)
NRBC # BLD: 0 /100 WBCS — SIGNIFICANT CHANGE UP
NRBC # FLD: 0 K/UL — SIGNIFICANT CHANGE UP
PLATELET # BLD AUTO: 320 K/UL — SIGNIFICANT CHANGE UP (ref 150–400)
POTASSIUM SERPL-MCNC: 3.5 MMOL/L — SIGNIFICANT CHANGE UP (ref 3.5–5.3)
POTASSIUM SERPL-SCNC: 3.5 MMOL/L — SIGNIFICANT CHANGE UP (ref 3.5–5.3)
RBC # BLD: 4.36 M/UL — SIGNIFICANT CHANGE UP (ref 3.8–5.2)
RBC # FLD: 13 % — SIGNIFICANT CHANGE UP (ref 10.3–14.5)
SODIUM SERPL-SCNC: 138 MMOL/L — SIGNIFICANT CHANGE UP (ref 135–145)
WBC # BLD: 8.99 K/UL — SIGNIFICANT CHANGE UP (ref 3.8–10.5)
WBC # FLD AUTO: 8.99 K/UL — SIGNIFICANT CHANGE UP (ref 3.8–10.5)

## 2021-04-30 RX ORDER — SODIUM CHLORIDE 9 MG/ML
3 INJECTION INTRAMUSCULAR; INTRAVENOUS; SUBCUTANEOUS ONCE
Refills: 0 | Status: DISCONTINUED | OUTPATIENT
Start: 2021-05-10 | End: 2021-05-25

## 2021-04-30 RX ORDER — SODIUM CHLORIDE 9 MG/ML
1000 INJECTION, SOLUTION INTRAVENOUS
Refills: 0 | Status: DISCONTINUED | OUTPATIENT
Start: 2021-05-10 | End: 2021-05-25

## 2021-04-30 NOTE — H&P PST ADULT - SPO2 (%)
Pain Management Attending Addendum    SUBJECTIVE: Patient doing well with IV PCA    Therapy:    [X] IV PCA         [ ] PRN Analgesics    OBJECTIVE:   [X] Pain appropriately controlled    [ ] Other:    Side Effects:  [X] None	             [ ] Nausea              [ ] Pruritis                	[ ] Other:    ASSESSMENT/PLAN: Continue current therapy    Comments: 99

## 2021-04-30 NOTE — H&P PST ADULT - NSICDXFAMILYHX_GEN_ALL_CORE_FT
FAMILY HISTORY:  No pertinent family history in first degree relatives    Mother  Still living? Unknown  Family history of diabetes mellitus, Age at diagnosis: Age Unknown    Sibling  Still living? Yes, Estimated age: Age Unknown  Family history of diabetes mellitus, Age at diagnosis: Age Unknown  Family history of thyroid cancer, Age at diagnosis: Age Unknown

## 2021-04-30 NOTE — H&P PST ADULT - NEGATIVE GASTROINTESTINAL SYMPTOMS
no vomiting/no diarrhea/no change in bowel habits/no abdominal pain/no melena/no jaundice/no hiccoughs

## 2021-04-30 NOTE — H&P PST ADULT - NEGATIVE GENERAL GENITOURINARY SYMPTOMS
no hematuria/no renal colic/no flank pain L/no flank pain R/no bladder infections/no dysuria/no urinary hesitancy/normal urinary frequency

## 2021-04-30 NOTE — H&P PST ADULT - NSANTHOSAYNRD_GEN_A_CORE
Sleep study scheduled on 04/30/2021- Dr. Javon Guadarrama  (ENT) [phone]/No. BART screening performed.  STOP BANG Legend: 0-2 = LOW Risk; 3-4 = INTERMEDIATE Risk; 5-8 = HIGH Risk

## 2021-04-30 NOTE — H&P PST ADULT - NSICDXPROBLEM_GEN_ALL_CORE_FT
PROBLEM DIAGNOSES  Problem: Disease of salivary gland, unspecified  Assessment and Plan: Scheduled for left parotidectomy with facial nerve dissection on 05/10/2021. Prte op instructions, famotidine given and explained. Pt verbalized understanding.  Pt confirmed scheduled appt for covid test pre op

## 2021-04-30 NOTE — H&P PST ADULT - NSICDXPASTMEDICALHX_GEN_ALL_CORE_FT
PAST MEDICAL HISTORY:  Anemia, unspecified type     Anxiety     Depression     Herniated nucleus pulposus     PCOS (polycystic ovarian syndrome)     Postoperative nausea and vomiting

## 2021-04-30 NOTE — H&P PST ADULT - NSICDXPASTSURGICALHX_GEN_ALL_CORE_FT
PAST SURGICAL HISTORY:   delivery delivered x 2  12 m 9-11, repeat 13 10-1 m gdm insulin    H/O gastric bypass     H/O laminectomy L4-L5      S/P cholecystectomy 2014    S/P gastric bypass 2014 gastric sleeve    S/P hernia repair 2014     PAST SURGICAL HISTORY:   delivery delivered x 3;   12 m 9-11, repeat 13 10-1 m gdm insulin;  10/11/2020 M    H/O gastric bypass     H/O laminectomy L4-L5; 2016    S/P cholecystectomy 2014    S/P gastric bypass 2014;  gastric sleeve    S/P hernia repair 2014    S/P tubal ligation 10/11/2020
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
## 2021-04-30 NOTE — H&P PST ADULT - NEGATIVE GENERAL SYMPTOMS
no fever/no chills/no sweating/no anorexia/no weight loss/no weight gain/no polyphagia/no polyuria no fever/no chills/no sweating/no anorexia/no weight loss/no weight gain/no polyphagia/no polyuria/no malaise

## 2021-05-07 NOTE — ASU PATIENT PROFILE, ADULT - PMH
Anemia, unspecified type    Anxiety    Depression    Herniated nucleus pulposus    PCOS (polycystic ovarian syndrome)    Postoperative nausea and vomiting

## 2021-05-07 NOTE — ASU PATIENT PROFILE, ADULT - PSH
delivery delivered  x 3;   12 m 9-11, repeat 13 10-1 m gdm insulin;  10/11/2020 M  H/O gastric bypass    H/O laminectomy  L4-L5; 2016  S/P cholecystectomy  2014  S/P gastric bypass  2014;  gastric sleeve  S/P hernia repair  2014  S/P tubal ligation  10/11/2020

## 2021-05-09 ENCOUNTER — TRANSCRIPTION ENCOUNTER (OUTPATIENT)
Age: 36
End: 2021-05-09

## 2021-05-10 ENCOUNTER — NON-APPOINTMENT (OUTPATIENT)
Age: 36
End: 2021-05-10

## 2021-05-10 ENCOUNTER — RESULT REVIEW (OUTPATIENT)
Age: 36
End: 2021-05-10

## 2021-05-10 ENCOUNTER — APPOINTMENT (OUTPATIENT)
Dept: SURGERY | Facility: HOSPITAL | Age: 36
End: 2021-05-10

## 2021-05-10 ENCOUNTER — OUTPATIENT (OUTPATIENT)
Dept: OUTPATIENT SERVICES | Facility: HOSPITAL | Age: 36
LOS: 1 days | Discharge: HOME | End: 2021-05-10
Payer: COMMERCIAL

## 2021-05-10 VITALS
TEMPERATURE: 98 F | HEART RATE: 68 BPM | OXYGEN SATURATION: 100 % | RESPIRATION RATE: 18 BRPM | WEIGHT: 190.04 LBS | HEIGHT: 63 IN | SYSTOLIC BLOOD PRESSURE: 105 MMHG | DIASTOLIC BLOOD PRESSURE: 71 MMHG

## 2021-05-10 VITALS
OXYGEN SATURATION: 95 % | DIASTOLIC BLOOD PRESSURE: 68 MMHG | HEART RATE: 63 BPM | SYSTOLIC BLOOD PRESSURE: 114 MMHG | RESPIRATION RATE: 16 BRPM | TEMPERATURE: 98 F

## 2021-05-10 DIAGNOSIS — K11.9 DISEASE OF SALIVARY GLAND, UNSPECIFIED: ICD-10-CM

## 2021-05-10 DIAGNOSIS — Z98.890 OTHER SPECIFIED POSTPROCEDURAL STATES: Chronic | ICD-10-CM

## 2021-05-10 DIAGNOSIS — Z98.51 TUBAL LIGATION STATUS: Chronic | ICD-10-CM

## 2021-05-10 DIAGNOSIS — Z90.49 ACQUIRED ABSENCE OF OTHER SPECIFIED PARTS OF DIGESTIVE TRACT: Chronic | ICD-10-CM

## 2021-05-10 DIAGNOSIS — Z98.84 BARIATRIC SURGERY STATUS: Chronic | ICD-10-CM

## 2021-05-10 LAB — SARS-COV-2 N GENE NPH QL NAA+PROBE: NOT DETECTED

## 2021-05-10 PROCEDURE — 88307 TISSUE EXAM BY PATHOLOGIST: CPT | Mod: 26

## 2021-05-10 PROCEDURE — 15733 MUSC MYOQ/FSCQ FLP H&N PEDCL: CPT | Mod: 59

## 2021-05-10 PROCEDURE — 64716 REVISION OF CRANIAL NERVE: CPT | Mod: 59

## 2021-05-10 PROCEDURE — 42420 EXCISE PAROTID GLAND/LESION: CPT | Mod: LT

## 2021-05-10 RX ORDER — OXYCODONE AND ACETAMINOPHEN 5; 325 MG/1; MG/1
1 TABLET ORAL EVERY 6 HOURS
Refills: 0 | Status: DISCONTINUED | OUTPATIENT
Start: 2021-05-10 | End: 2021-05-10

## 2021-05-10 RX ORDER — BENZOCAINE AND MENTHOL 5; 1 G/100ML; G/100ML
1 LIQUID ORAL
Refills: 0 | Status: DISCONTINUED | OUTPATIENT
Start: 2021-05-10 | End: 2021-05-25

## 2021-05-10 RX ORDER — OXYCODONE HYDROCHLORIDE 5 MG/1
5 TABLET ORAL ONCE
Refills: 0 | Status: DISCONTINUED | OUTPATIENT
Start: 2021-05-10 | End: 2021-05-10

## 2021-05-10 RX ORDER — METHYLPHENIDATE HCL 5 MG
1 TABLET ORAL
Qty: 0 | Refills: 0 | DISCHARGE

## 2021-05-10 RX ORDER — FENTANYL CITRATE 50 UG/ML
50 INJECTION INTRAVENOUS
Refills: 0 | Status: DISCONTINUED | OUTPATIENT
Start: 2021-05-10 | End: 2021-05-10

## 2021-05-10 RX ORDER — ACETAMINOPHEN 500 MG
650 TABLET ORAL EVERY 6 HOURS
Refills: 0 | Status: DISCONTINUED | OUTPATIENT
Start: 2021-05-10 | End: 2021-05-25

## 2021-05-10 RX ORDER — SERTRALINE 25 MG/1
1 TABLET, FILM COATED ORAL
Qty: 0 | Refills: 0 | DISCHARGE

## 2021-05-10 RX ORDER — ACETAMINOPHEN 500 MG
2 TABLET ORAL
Qty: 0 | Refills: 0 | DISCHARGE
Start: 2021-05-10

## 2021-05-10 RX ORDER — HYDROMORPHONE HYDROCHLORIDE 2 MG/ML
0.25 INJECTION INTRAMUSCULAR; INTRAVENOUS; SUBCUTANEOUS
Refills: 0 | Status: DISCONTINUED | OUTPATIENT
Start: 2021-05-10 | End: 2021-05-10

## 2021-05-10 RX ORDER — ONDANSETRON 8 MG/1
4 TABLET, FILM COATED ORAL ONCE
Refills: 0 | Status: COMPLETED | OUTPATIENT
Start: 2021-05-10 | End: 2021-05-10

## 2021-05-10 RX ADMIN — ONDANSETRON 4 MILLIGRAM(S): 8 TABLET, FILM COATED ORAL at 18:00

## 2021-05-10 RX ADMIN — HYDROMORPHONE HYDROCHLORIDE 0.25 MILLIGRAM(S): 2 INJECTION INTRAMUSCULAR; INTRAVENOUS; SUBCUTANEOUS at 17:40

## 2021-05-10 RX ADMIN — HYDROMORPHONE HYDROCHLORIDE 0.25 MILLIGRAM(S): 2 INJECTION INTRAMUSCULAR; INTRAVENOUS; SUBCUTANEOUS at 17:50

## 2021-05-10 RX ADMIN — OXYCODONE HYDROCHLORIDE 5 MILLIGRAM(S): 5 TABLET ORAL at 19:38

## 2021-05-10 RX ADMIN — HYDROMORPHONE HYDROCHLORIDE 0.25 MILLIGRAM(S): 2 INJECTION INTRAMUSCULAR; INTRAVENOUS; SUBCUTANEOUS at 19:15

## 2021-05-10 RX ADMIN — HYDROMORPHONE HYDROCHLORIDE 0.25 MILLIGRAM(S): 2 INJECTION INTRAMUSCULAR; INTRAVENOUS; SUBCUTANEOUS at 18:45

## 2021-05-10 RX ADMIN — HYDROMORPHONE HYDROCHLORIDE 0.25 MILLIGRAM(S): 2 INJECTION INTRAMUSCULAR; INTRAVENOUS; SUBCUTANEOUS at 17:55

## 2021-05-10 RX ADMIN — HYDROMORPHONE HYDROCHLORIDE 0.25 MILLIGRAM(S): 2 INJECTION INTRAMUSCULAR; INTRAVENOUS; SUBCUTANEOUS at 18:30

## 2021-05-10 RX ADMIN — HYDROMORPHONE HYDROCHLORIDE 0.25 MILLIGRAM(S): 2 INJECTION INTRAMUSCULAR; INTRAVENOUS; SUBCUTANEOUS at 18:05

## 2021-05-10 NOTE — ASU DISCHARGE PLAN (ADULT/PEDIATRIC) - NURSING INSTRUCTIONS
DO NOT take any Tylenol (Acetaminophen) or narcotics containing Tylenol until after  11:15pm . You received Tylenol during your operation and it can cause damage to your liver if too much is taken within a 24 hour time period.

## 2021-05-10 NOTE — PACU DISCHARGE NOTE - THE ANESTHESIA ORDERS USED IN THE PACU ORDER SET WILL BE DISCONTINUED UPON TRANSFER OF THIS PATIENT
Patient stated that he is doing better, ROM is improving and he is able to left arm straight up now    Statement Selected

## 2021-05-10 NOTE — BRIEF OPERATIVE NOTE - NSICDXBRIEFPROCEDURE_GEN_ALL_CORE_FT
PROCEDURES:  Excision, parotid gland, superficial lobe, with facial nerve dissection and preservation 10-May-2021 17:27:52  Chino Segovia

## 2021-05-10 NOTE — ASU DISCHARGE PLAN (ADULT/PEDIATRIC) - CARE PROVIDER_API CALL
Philip Reeves)  Plastic Surgery  [address] [place] [address] [address]  [place] [address]  Phone: [phone]  Fax: [phone]  Follow Up Time:

## 2021-05-11 ENCOUNTER — APPOINTMENT (OUTPATIENT)
Dept: SURGERY | Facility: CLINIC | Age: 36
End: 2021-05-11
Payer: COMMERCIAL

## 2021-05-11 PROCEDURE — 99024 POSTOP FOLLOW-UP VISIT: CPT

## 2021-05-11 NOTE — ASSESSMENT
[FreeTextEntry1] : s/p parotidectomy\par daily care\par drain removed\par call for path\par f/u 1 month

## 2021-05-14 LAB — SURGICAL PATHOLOGY STUDY: SIGNIFICANT CHANGE UP

## 2021-05-17 ENCOUNTER — NON-APPOINTMENT (OUTPATIENT)
Age: 36
End: 2021-05-17

## 2021-06-01 ENCOUNTER — APPOINTMENT (OUTPATIENT)
Dept: SURGERY | Facility: CLINIC | Age: 36
End: 2021-06-01
Payer: COMMERCIAL

## 2021-06-01 ENCOUNTER — TRANSCRIPTION ENCOUNTER (OUTPATIENT)
Age: 36
End: 2021-06-01

## 2021-06-01 DIAGNOSIS — R21 RASH AND OTHER NONSPECIFIC SKIN ERUPTION: ICD-10-CM

## 2021-06-01 PROCEDURE — 99024 POSTOP FOLLOW-UP VISIT: CPT

## 2021-06-01 NOTE — ASSESSMENT
[FreeTextEntry1] : s/p left parotidectomy\par facial rash r/o shingles\par Derm consult-DR Pandya will see her today\par f/u as scheduled

## 2021-06-01 NOTE — PHYSICAL EXAM
[de-identified] : well healed scar [Midline] : located in midline position [Normal] : orientation to person, place, and time: normal [FreeTextEntry2] : Left cheek erythematous patches with small vesicles

## 2021-06-01 NOTE — HISTORY OF PRESENT ILLNESS
[de-identified] : Pt 3 weeks s/p left parotidectomy c/o 1 day history of rash cheek, pain and oozing denies fever

## 2021-06-02 ENCOUNTER — EMERGENCY (EMERGENCY)
Facility: HOSPITAL | Age: 36
LOS: 1 days | Discharge: HOME | End: 2021-06-02
Attending: STUDENT IN AN ORGANIZED HEALTH CARE EDUCATION/TRAINING PROGRAM | Admitting: STUDENT IN AN ORGANIZED HEALTH CARE EDUCATION/TRAINING PROGRAM
Payer: COMMERCIAL

## 2021-06-02 VITALS
SYSTOLIC BLOOD PRESSURE: 118 MMHG | HEIGHT: 63 IN | HEART RATE: 88 BPM | DIASTOLIC BLOOD PRESSURE: 79 MMHG | TEMPERATURE: 99 F | OXYGEN SATURATION: 100 % | RESPIRATION RATE: 16 BRPM

## 2021-06-02 DIAGNOSIS — Z98.84 BARIATRIC SURGERY STATUS: Chronic | ICD-10-CM

## 2021-06-02 DIAGNOSIS — Z98.51 TUBAL LIGATION STATUS: Chronic | ICD-10-CM

## 2021-06-02 DIAGNOSIS — Z98.890 OTHER SPECIFIED POSTPROCEDURAL STATES: Chronic | ICD-10-CM

## 2021-06-02 DIAGNOSIS — Z90.49 ACQUIRED ABSENCE OF OTHER SPECIFIED PARTS OF DIGESTIVE TRACT: Chronic | ICD-10-CM

## 2021-06-02 LAB
ALBUMIN SERPL ELPH-MCNC: 4.5 G/DL — SIGNIFICANT CHANGE UP (ref 3.3–5)
ALP SERPL-CCNC: 82 U/L — SIGNIFICANT CHANGE UP (ref 40–120)
ALT FLD-CCNC: 13 U/L — SIGNIFICANT CHANGE UP (ref 4–33)
ANION GAP SERPL CALC-SCNC: 12 MMOL/L — SIGNIFICANT CHANGE UP (ref 7–14)
AST SERPL-CCNC: 16 U/L — SIGNIFICANT CHANGE UP (ref 4–32)
BASOPHILS # BLD AUTO: 0.05 K/UL — SIGNIFICANT CHANGE UP (ref 0–0.2)
BASOPHILS NFR BLD AUTO: 0.5 % — SIGNIFICANT CHANGE UP (ref 0–2)
BILIRUB SERPL-MCNC: 0.4 MG/DL — SIGNIFICANT CHANGE UP (ref 0.2–1.2)
BUN SERPL-MCNC: 13 MG/DL — SIGNIFICANT CHANGE UP (ref 7–23)
CALCIUM SERPL-MCNC: 9.6 MG/DL — SIGNIFICANT CHANGE UP (ref 8.4–10.5)
CHLORIDE SERPL-SCNC: 106 MMOL/L — SIGNIFICANT CHANGE UP (ref 98–107)
CO2 SERPL-SCNC: 21 MMOL/L — LOW (ref 22–31)
CREAT SERPL-MCNC: 0.61 MG/DL — SIGNIFICANT CHANGE UP (ref 0.5–1.3)
EOSINOPHIL # BLD AUTO: 0.28 K/UL — SIGNIFICANT CHANGE UP (ref 0–0.5)
EOSINOPHIL NFR BLD AUTO: 3 % — SIGNIFICANT CHANGE UP (ref 0–6)
GLUCOSE SERPL-MCNC: 82 MG/DL — SIGNIFICANT CHANGE UP (ref 70–99)
HCT VFR BLD CALC: 41.1 % — SIGNIFICANT CHANGE UP (ref 34.5–45)
HGB BLD-MCNC: 13.3 G/DL — SIGNIFICANT CHANGE UP (ref 11.5–15.5)
IANC: 4.88 K/UL — SIGNIFICANT CHANGE UP (ref 1.5–8.5)
IMM GRANULOCYTES NFR BLD AUTO: 0.3 % — SIGNIFICANT CHANGE UP (ref 0–1.5)
LYMPHOCYTES # BLD AUTO: 3.42 K/UL — HIGH (ref 1–3.3)
LYMPHOCYTES # BLD AUTO: 36 % — SIGNIFICANT CHANGE UP (ref 13–44)
MCHC RBC-ENTMCNC: 28.9 PG — SIGNIFICANT CHANGE UP (ref 27–34)
MCHC RBC-ENTMCNC: 32.4 GM/DL — SIGNIFICANT CHANGE UP (ref 32–36)
MCV RBC AUTO: 89.2 FL — SIGNIFICANT CHANGE UP (ref 80–100)
MONOCYTES # BLD AUTO: 0.83 K/UL — SIGNIFICANT CHANGE UP (ref 0–0.9)
MONOCYTES NFR BLD AUTO: 8.7 % — SIGNIFICANT CHANGE UP (ref 2–14)
NEUTROPHILS # BLD AUTO: 4.88 K/UL — SIGNIFICANT CHANGE UP (ref 1.8–7.4)
NEUTROPHILS NFR BLD AUTO: 51.5 % — SIGNIFICANT CHANGE UP (ref 43–77)
NRBC # BLD: 0 /100 WBCS — SIGNIFICANT CHANGE UP
NRBC # FLD: 0 K/UL — SIGNIFICANT CHANGE UP
PLATELET # BLD AUTO: 320 K/UL — SIGNIFICANT CHANGE UP (ref 150–400)
POTASSIUM SERPL-MCNC: 4.1 MMOL/L — SIGNIFICANT CHANGE UP (ref 3.5–5.3)
POTASSIUM SERPL-SCNC: 4.1 MMOL/L — SIGNIFICANT CHANGE UP (ref 3.5–5.3)
PROT SERPL-MCNC: 7.7 G/DL — SIGNIFICANT CHANGE UP (ref 6–8.3)
RBC # BLD: 4.61 M/UL — SIGNIFICANT CHANGE UP (ref 3.8–5.2)
RBC # FLD: 13.2 % — SIGNIFICANT CHANGE UP (ref 10.3–14.5)
SODIUM SERPL-SCNC: 139 MMOL/L — SIGNIFICANT CHANGE UP (ref 135–145)
WBC # BLD: 9.49 K/UL — SIGNIFICANT CHANGE UP (ref 3.8–10.5)
WBC # FLD AUTO: 9.49 K/UL — SIGNIFICANT CHANGE UP (ref 3.8–10.5)

## 2021-06-02 PROCEDURE — 99284 EMERGENCY DEPT VISIT MOD MDM: CPT

## 2021-06-02 RX ORDER — KETOROLAC TROMETHAMINE 30 MG/ML
15 SYRINGE (ML) INJECTION ONCE
Refills: 0 | Status: DISCONTINUED | OUTPATIENT
Start: 2021-06-02 | End: 2021-06-02

## 2021-06-02 RX ADMIN — Medication 15 MILLIGRAM(S): at 18:14

## 2021-06-02 RX ADMIN — Medication 50 MILLIGRAM(S): at 19:43

## 2021-06-02 NOTE — ED ADULT NURSE NOTE - BREATHING, MLM
No new symptom complaint, vital signs stable, all labs resulted, evaluated by Md and cleared for safe discharge to home. Spontaneous, unlabored and symmetrical

## 2021-06-02 NOTE — ED PROVIDER NOTE - PHYSICAL EXAMINATION
Gen: Well appearing in NAD  Head: NC/AT  Neck: trachea midline  Resp:  No distress, lungs clear  ears: tms normal   Ext: no deformities  Neuro:  A&O appears non focal  Skin:  ertyhema and warmth of left face, extending to left ear with tenderness of left ear, no vesicles noted,   Psych:  Normal affect and mood

## 2021-06-02 NOTE — ED PROVIDER NOTE - CONSTITUTIONAL NEGATIVE STATEMENT, MLM
Your Child's Health  [age]-Year-Old Visit      Inna Becker  November 10, 2020    Visit Vitals  Ht 3' 3\" (0.991 m)   Wt 16.4 kg (36 lb 1.6 oz)   BMI 16.69 kg/m²     Weight: 36.1 lbs    SUPPLEMENTS RECOMMENDED FOR CHILDREN:    Children's Multivitamin once daily (with iron if not eating red meat 2-3 times weekly).  This will provide essential Vitamin D due [place] with less sun exposure.  McGregor's Complete is a very balanced and complete vitamin.      Vitamin C 125-250 mg twice daily  (essential for collagen formation - tendons, ligaments, bones, muscle and blood vessel health and as an antioxidant for immune system function). Vitafusion Power C for adults is 125 mg per gummy and safe for children.  We do not store or make vitamin C as humans, so it is important to consume it multiple times per day for it to have its best effect.    Omega 3 Fatty Acid  Supplement - There are lots of choices but they all differ in taste.  Find one that your child likes. Examples would include Garden of Life Omega 3 Soft gel, Vitacost Omega-3 Gummies for Kids, [employer] Bears (Hero Nutritional Products) [employer] Bears DHA,  [employer] Childrens DHA [place]) For brain and cardiovascular health.   If you consume fatty fish like [place], Tuna, Mackerel or Sardines 2 times per week, you do not need an Omega 3 supplement.    Calcium - Aim for 24 oz of milk per day to get 1000 mg of Calcium for bone and tooth development.  8 oz of milk = 8 oz of Calcium fortified orange juice = 6 oz of yogurt = 4 oz of cheese.      NUTRITION:  Encourage Inna to eat fruits and vegetables. These can be used as snacks as well as a part of meals.  They provide minerals, vitamins, and fiber. Eating fruits and vegetables is associated with lower rates of heart disease and cancer as your child grows older.  Be a good role model and eat healthy foods yourself. A multivitamin with 400 U vitamin D should be given to all children who  drink less than 32 oz of milk per day.    Obesity and being overweight are serious problems in the [place]. You can help your child avoid these problems by following these guidelines:  · Limit TV and video total time to 2 hours per day or less.  · Don't encourage your children to eat if they tell you they are full. Healthy children will not starve themselves.  · Don't reward your children for cleaning their plates. If they always leave food, reduce the size of the portions and tell them to ask for more if they are still hungry.  · Don't allow children to dish out their own portions. They will imitate adults or imitate what they have seen on TV; in both cases, the amount will be too large. This results in increased calories and waste. At least for children above 5 years of age and for adults, people eat more when given larger portions.    DEVELOPMENT/BEHAVIOR:  Four-year-old children can generally jump and hop, and they have much better balance than three-year-olds. Most can dress and undress themselves and brush their own teeth. They begin to imitate adults and are expert at embarrassing their parents. They are much more likely to misbehave at home than in public, and they are likely to prefer their four-year-old friends to family at times. They have a huge fraction of their adult vocabulary and are experimenting with words that carry emotions. Inna will like to see your reaction when she talks about body parts, body functions, and death. She will ask a lot of questions but seem bored by long answers.    ACCIDENT PREVENTION (accidents account for 40 percent of deaths at this age):    · Car Safety:  When Inna outgrows the car seat (and weighs at least 40 pounds), you should use an approved booster seat. You may use your car's lap/shoulder belt, but don't let the shoulder belt run across the neck. The seat belt should be across the hips and not across the stomach.  Inna should ride in the back seat. The  center seat is the safest if it has a shoulder harness. Many local police departments, hospitals, and fire departments have a program to check the installation of your car seat or booster seat. Occasionally, car dealers will do the same. These inspections are by appointment.  When you call the police or fire department for an appointment, do not use the emergency number.     · Water Safety:  Consider swimming lessons and emphasize water safety.  Remind Inna never to swim without adult supervision and not to dive head-first into a pool.  · Strangers:  Begin to teach Inna about strangers. This is hard for children to understand and will require much repetition and many examples.  · Sharp Objects:  Keep sharp knives, scissors, and tools away from children at this age. They cannot be used even with supervision because the children are too fast.  · Burns/Fire:  Teach Inna about hot and cold. Remind her about matches, hot grills, and hot mufflers on yard equipment. Have a family fire safety plan, including regular smoke detector battery checks, working fire extinguishers, and two preplanned escape routes.  · Lawn Mowing:  Keep Inna far away when you are mowing the lawn. There have been many serious injuries from thrown rocks.  · Bike Helmets:  Inna should wear a helmet when riding on bicycles or tricycles or when roller skating.  · ID Bracelets:  If Inna attends school or , consider purchasing an ID bracelet with her initials (not name), address, and phone number listed.    SMOKING:  Children exposed to tobacco smoke have more ear infections and pneumonia.  Cold symptoms last longer. If you smoke, please quit.  If you cannot quit, smoke outside. Do not smoke near Inna, and do not let others smoke near her.    TUBERCULOSIS:  There is such a low rate of tuberculosis in our area that frequent skin tests are no longer recommended. However, certain situations do increase Inna's risk, including contact  with AIDS patients, nursing home residents, prisoners, immigrants, or homeless persons. Please let us know if Inna has contacts with such persons, or if she has contact with anyone known to have or suspected of having tuberculosis.    TELEVISION/VIDEO:  · Limit viewing to a maximum of two hours per day.  · Excessive TV is associated with obesity, aggressive behavior, and negative moods.  · Do not allow TV shows or videos that promote bad attitudes.  Many videos consistently portray women or minorities as victims.  · Children cannot distinguish commercials from programs until 6-8 years of age. When the  says, \"Kids, tell your mom to buy...,\" children often think this is an order from an adult.  · Teach Inna not to eat while watching TV by not doing it yourself.  · Encourage other forms of learning and entertainment, such as books, story-telling, and trips to museums, farms, zoos, etc.       MEDICATION FOR FEVER OR PAIN:     Ibuprofen (Motrin/Advil)  Infant or Children's Suspension (100 mg/5 mL).    Most Recent Immunizations   Administered Date(s) Administered   • DTaP(INFANRIX) 11/10/2020   • DTaP/Hep B/IPV 05/03/2017   • Hep A, ped/adol, 2 dose 05/15/2018   • Hep B, adolescent or pediatric 2016   • Hib (PRP-OMP) 11/01/2017   • Influenza, injectable, quadrivalent, preservative free, pediatric 11/06/2018   • Influenza, injectable, quadrivalent, preservative-free 10/04/2019   • Influenza, seasonal, injectable, preservative free 10/01/2020   • MMR 02/02/2018   • Pneumococcal Conjugate 13 valent 11/01/2017   • Polio, INACTIVATED 11/10/2020   • Rotavirus - pentavalent 05/03/2017   • Varicella 02/02/2018       If Inna develops any of the following reactions within 72 hours after an immunization, notify your pediatrician by calling the pediatric phone nurse:  · A temperature of 105 degrees or above.  · More than 3 hours of continuous crying.  · A shrill, high-pitched cry.  · A pale, limp spell.  · A  seizure or fainting spell.  In this case, you should call 911 or go immediately to the emergency room.      NEXT VISIT:  3 YEARS OF AGE    Thank you for entrusting your care to [facility].   no fever and no chills.

## 2021-06-02 NOTE — ED PROVIDER NOTE - PROGRESS NOTE DETAILS
low suspicion for shingles based on exam, more likely erysipelas, however since sent in by affiliated dermatologist for possible shingles, and disseminated since rash on upper chest wall, will get derm consult, spoke with resident misty esqueda and pictures sent via teams, derm to respond with recs per dermatology likely contact or allergic dermatitis, recs to give 50mg prednisone x5 days, continue with clobetasol x 1 week and follow up tomorrow in derm clinic

## 2021-06-02 NOTE — ED PROVIDER NOTE - CLINICAL SUMMARY MEDICAL DECISION MAKING FREE TEXT BOX
36yo F with warmth, redness, itching, pain to right side of face, likely cellulitis, will give abx, consider cdu

## 2021-06-02 NOTE — ED ADULT TRIAGE NOTE - CHIEF COMPLAINT QUOTE
S/P carotid gland removal 5/10.  Left neck incision site closed.  Pt c/o rash to left side of face, neck, and chest since yesterday.  +Pain and itching.  Was given topical steroid with no relief.

## 2021-06-02 NOTE — ED PROVIDER NOTE - NSFOLLOWUPINSTRUCTIONS_ED_ALL_ED_FT
1. TAKE ALL MEDICATIONS AS DIRECTED.    2. FOR PAIN OR FEVER YOU CAN TAKE IBUPROFEN (MOTRIN, ADVIL) OR ACETAMINOPHEN (TYLENOL) AS NEEDED, AS DIRECTED ON PACKAGING.  3. FOLLOW UP WITH YOUR PRIMARY DOCTOR WITHIN 5 DAYS AS DIRECTED.  4. IF YOU HAD LABS OR IMAGING DONE, YOU WERE GIVEN COPIES OF ALL LABS AND/OR IMAGING RESULTS FROM YOUR ER VISIT--PLEASE TAKE THEM WITH YOU TO YOUR FOLLOW UP APPOINTMENTS.  5. IF NEEDED, CALL PATIENT ACCESS SERVICES AT [phone] TO FIND A PRIMARY CARE PHYSICIAN.  OR CALL [phone] TO MAKE AN APPOINTMENT WITH THE CLINIC.  6. RETURN TO THE ER FOR ANY WORSENING SYMPTOMS OR CONCERNS.    Continue with clobetasol for 1 week as directed  Take prednisone once a day for 4 days.  Follow up with dermatology tomorrow at 11:30 or Friday at 12. Call for appointment [phone]  [address] [facility] [address]  Return to ED for worsening symptoms                    CONTACT DERMATITIS - General Information           Contact Dermatitis    WHAT YOU NEED TO KNOW:    What is contact dermatitis? Contact dermatitis is a skin rash. It develops when you touch something that irritates your skin or causes an allergic reaction.     What causes contact dermatitis? The following items are common irritants or allergens that can cause contact dermatitis:   •Soaps, lotions, or makeup      •Chemicals, such as those in cleaning products      •Urine or bowel movement      •Coarse paper, glass, or wool      •Plants, such as poison ivy or poison oak      •Certain metals, such as chromium or nickel      •Rubber or latex      •Certain topical medicines, such as antibiotic or steroid cream      What are the signs and symptoms of contact dermatitis?   •Red, swollen, painful rash      •Skin that itches, stings, or burns      •Dry, scaly, or crusty skin patches      •Bumps or blisters      •Fluid draining from blisters      How is contact dermatitis diagnosed? Your healthcare provider can diagnose your rash by looking at it. He will ask when your signs and symptoms started. Tell him how long they last and what triggers them. He may ask if you have been exposed to any new chemicals, products, or topical medicines.    How is contact dermatitis treated? The best treatment is to remove whatever irritant or allergen causes your rash. You may also need medicines to decrease itching and swelling. They will be given as a topical medicine to apply to your rash or as a pill.    How can I manage my symptoms?   •Take short baths or showers in cool water. Use mild soap or soap-free cleansers. Add oatmeal, baking soda, or cornstarch to the bath water to help decrease skin irritation.      •Avoid skin irritants, such as makeup, hair products, soaps, and cleansers. Use products that do not contain perfume or dye.      •Apply a cool compress to your rash. This will help soothe your skin.       •Keep your skin moist. Rub unscented cream or lotion on your skin to prevent dryness and itching. Do this right after a bath or shower when your skin is still damp.      Call 911 for any of the following:   •You have sudden trouble breathing.      •Your throat swells and you have trouble eating.      •Your face is swollen.      When should I contact my healthcare provider?   •You have a fever.      •Your blisters are draining pus.      •Your rash spreads or does not get better, even after treatment.      •You have questions or concerns about your condition or care.      CARE AGREEMENT:    You have the right to help plan your care. Learn about your health condition and how it may be treated. Discuss treatment options with your healthcare providers to decide what care you want to receive. You always have the right to refuse treatment.

## 2021-06-02 NOTE — ED ADULT NURSE NOTE - OBJECTIVE STATEMENT
Receive pt. in Intake room 7 alert and oriented x 4, presenting tot he ER with complaints of rash to left side of face, neck, and upper chest . Pt. stated" I had parathyroid surgery 3 weeks ago and for the past 2 days I have a rash on my face with pain and itching. Medicated as ordered, labs sent. Will continue to monitor, call bell within reach.

## 2021-06-02 NOTE — ED PROVIDER NOTE - PATIENT PORTAL LINK FT
You can access the FollowMyHealth Patient Portal offered by [facility] by registering at the following website: [email or web address]. By joining [employer]’s FollowMyHealth portal, you will also be able to view your health information using other applications (apps) compatible with our system.

## 2021-06-02 NOTE — ED PROVIDER NOTE - OBJECTIVE STATEMENT
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 pt was seen by her surgeon yesterday who said wound looked fine, and then went to a dermatologist who was concerned that pt may have shingles/tubbs hunt syndrome so sent to ED for eval. 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 pt was seen by her surgeon yesterday who said wound looked fine, and then went to a dermatologist who was concerned that pt may have shingles/tubbs hunt syndrome so sent to ED for eval. denies fevers, chills, no pain proceeding rash but since redness occurred has pain, itching  dermatologist gave her clabetasol

## 2021-06-04 ENCOUNTER — APPOINTMENT (OUTPATIENT)
Dept: DERMATOLOGY | Facility: CLINIC | Age: 36
End: 2021-06-04

## 2021-06-08 ENCOUNTER — TRANSCRIPTION ENCOUNTER (OUTPATIENT)
Age: 36
End: 2021-06-08

## 2021-06-10 ENCOUNTER — APPOINTMENT (OUTPATIENT)
Dept: SURGERY | Facility: CLINIC | Age: 36
End: 2021-06-10
Payer: COMMERCIAL

## 2021-06-10 PROCEDURE — 99024 POSTOP FOLLOW-UP VISIT: CPT

## 2021-06-10 NOTE — ASSESSMENT
[FreeTextEntry1] : s/p Parotidectomy\par rash thought to be severe contact dermatitis\par f/u Derm\par f/u 4 months

## 2021-06-10 NOTE — PHYSICAL EXAM
[de-identified] : well healed scar [Midline] : located in midline position [Normal] : orientation to person, place, and time: normal

## 2021-10-12 ENCOUNTER — APPOINTMENT (OUTPATIENT)
Dept: SURGERY | Facility: CLINIC | Age: 36
End: 2021-10-12

## 2021-10-14 ENCOUNTER — TRANSCRIPTION ENCOUNTER (OUTPATIENT)
Age: 36
End: 2021-10-14

## 2021-12-23 ENCOUNTER — TRANSCRIPTION ENCOUNTER (OUTPATIENT)
Age: 36
End: 2021-12-23

## 2021-12-25 ENCOUNTER — TRANSCRIPTION ENCOUNTER (OUTPATIENT)
Age: 36
End: 2021-12-25

## 2022-01-01 ENCOUNTER — TRANSCRIPTION ENCOUNTER (OUTPATIENT)
Age: 37
End: 2022-01-01

## 2022-02-03 ENCOUNTER — TRANSCRIPTION ENCOUNTER (OUTPATIENT)
Age: 37
End: 2022-02-03

## 2022-03-08 ENCOUNTER — TRANSCRIPTION ENCOUNTER (OUTPATIENT)
Age: 37
End: 2022-03-08

## 2022-03-08 ENCOUNTER — EMERGENCY (EMERGENCY)
Facility: HOSPITAL | Age: 37
LOS: 1 days | Discharge: HOME | End: 2022-03-08
Attending: EMERGENCY MEDICINE
Payer: COMMERCIAL

## 2022-03-08 VITALS
DIASTOLIC BLOOD PRESSURE: 73 MMHG | RESPIRATION RATE: 18 BRPM | HEART RATE: 96 BPM | HEIGHT: 63 IN | OXYGEN SATURATION: 100 % | SYSTOLIC BLOOD PRESSURE: 126 MMHG | WEIGHT: 138.01 LBS | TEMPERATURE: 98 F

## 2022-03-08 DIAGNOSIS — Z98.890 OTHER SPECIFIED POSTPROCEDURAL STATES: Chronic | ICD-10-CM

## 2022-03-08 DIAGNOSIS — Z98.84 BARIATRIC SURGERY STATUS: Chronic | ICD-10-CM

## 2022-03-08 DIAGNOSIS — Z90.49 ACQUIRED ABSENCE OF OTHER SPECIFIED PARTS OF DIGESTIVE TRACT: Chronic | ICD-10-CM

## 2022-03-08 DIAGNOSIS — Z98.51 TUBAL LIGATION STATUS: Chronic | ICD-10-CM

## 2022-03-08 PROCEDURE — 99284 EMERGENCY DEPT VISIT MOD MDM: CPT | Mod: 25

## 2022-03-08 PROCEDURE — 73140 X-RAY EXAM OF FINGER(S): CPT

## 2022-03-08 PROCEDURE — 96374 THER/PROPH/DIAG INJ IV PUSH: CPT | Mod: XU

## 2022-03-08 PROCEDURE — 99284 EMERGENCY DEPT VISIT MOD MDM: CPT

## 2022-03-08 PROCEDURE — 12001 RPR S/N/AX/GEN/TRNK 2.5CM/<: CPT

## 2022-03-08 PROCEDURE — 90471 IMMUNIZATION ADMIN: CPT

## 2022-03-08 PROCEDURE — 90715 TDAP VACCINE 7 YRS/> IM: CPT

## 2022-03-08 PROCEDURE — 73140 X-RAY EXAM OF FINGER(S): CPT | Mod: 26,RT

## 2022-03-08 RX ORDER — CEFAZOLIN SODIUM 1 G
1000 VIAL (EA) INJECTION ONCE
Refills: 0 | Status: COMPLETED | OUTPATIENT
Start: 2022-03-08 | End: 2022-03-08

## 2022-03-08 RX ORDER — CEPHALEXIN 500 MG
1 CAPSULE ORAL
Qty: 28 | Refills: 0
Start: 2022-03-08 | End: 2022-03-14

## 2022-03-08 RX ORDER — TETANUS TOXOID, REDUCED DIPHTHERIA TOXOID AND ACELLULAR PERTUSSIS VACCINE, ADSORBED 5; 2.5; 8; 8; 2.5 [IU]/.5ML; [IU]/.5ML; UG/.5ML; UG/.5ML; UG/.5ML
0.5 SUSPENSION INTRAMUSCULAR ONCE
Refills: 0 | Status: COMPLETED | OUTPATIENT
Start: 2022-03-08 | End: 2022-03-08

## 2022-03-08 RX ORDER — IBUPROFEN 200 MG
600 TABLET ORAL ONCE
Refills: 0 | Status: COMPLETED | OUTPATIENT
Start: 2022-03-08 | End: 2022-03-08

## 2022-03-08 RX ADMIN — TETANUS TOXOID, REDUCED DIPHTHERIA TOXOID AND ACELLULAR PERTUSSIS VACCINE, ADSORBED 0.5 MILLILITER(S): 5; 2.5; 8; 8; 2.5 SUSPENSION INTRAMUSCULAR at 11:26

## 2022-03-08 RX ADMIN — Medication 600 MILLIGRAM(S): at 11:26

## 2022-03-08 RX ADMIN — Medication 100 MILLIGRAM(S): at 12:45

## 2022-03-08 NOTE — ED PROVIDER NOTE - PATIENT PORTAL LINK FT
You can access the FollowMyHealth Patient Portal offered by [facility] by registering at the following website: [email or web address]. By joining [employer]’s FollowMyHealth portal, you will also be able to view your health information using other applications (apps) compatible with our system.

## 2022-03-08 NOTE — ED PROVIDER NOTE - PHYSICAL EXAMINATION
Right 1st finger - Laceration noted to tip of finger, going under nail. Approximately 1.5 cm. Tenderness to tip of finger. Full FROM at all joints.

## 2022-03-08 NOTE — ED PROVIDER NOTE - NSICDXPASTSURGICALHX_GEN_ALL_CORE_FT
PAST SURGICAL HISTORY:   delivery delivered x 3;   12 m 9-11, repeat 13 10-1 m gdm insulin;  10/11/2020 M    H/O gastric bypass     H/O laminectomy L4-L5; 2016    S/P cholecystectomy 2014    S/P gastric bypass 2014;  gastric sleeve    S/P hernia repair 2014    S/P tubal ligation 10/11/2020

## 2022-03-08 NOTE — ED PROVIDER NOTE - ATTENDING CONTRIBUTION TO CARE
36 year old female with a crush injury to right 1st finger. +laceration. Will obtain xray to r/o fracture. Give tetanus and suture.

## 2022-03-08 NOTE — ED PROVIDER NOTE - NS ED ATTENDING STATEMENT MOD
This was a shared visit with the TY. I reviewed and verified the documentation and independently performed the documented:

## 2022-03-08 NOTE — ED PROVIDER NOTE - CARE PROVIDER_API CALL
Gray Thakkar)  Plastic Surgery  [address] [place] [address], [address] [place] [id number]  Phone: [phone]  Fax: [phone]  Follow Up Time:

## 2022-03-08 NOTE — ED PROVIDER NOTE - OBJECTIVE STATEMENT
36 year old female with no medical history presents with pain and laceration to right thumb after getting her hand caught in the car door. Reports she closed the door, the door locked and so it took some time for her to be able to get her thumb out. Has not taken any medications for pain. Unknown last tetanus.

## 2022-03-11 ENCOUNTER — NON-APPOINTMENT (OUTPATIENT)
Age: 37
End: 2022-03-11

## 2022-03-11 ENCOUNTER — APPOINTMENT (OUTPATIENT)
Dept: ORTHOPEDIC SURGERY | Facility: CLINIC | Age: 37
End: 2022-03-11
Payer: COMMERCIAL

## 2022-03-11 VITALS
SYSTOLIC BLOOD PRESSURE: 132 MMHG | WEIGHT: 136 LBS | DIASTOLIC BLOOD PRESSURE: 86 MMHG | HEIGHT: 65 IN | HEART RATE: 88 BPM | BODY MASS INDEX: 22.66 KG/M2

## 2022-03-11 DIAGNOSIS — S62.639A DISPLACED FRACTURE OF DISTAL PHALANX OF UNSPECIFIED FINGER, INITIAL ENCOUNTER FOR CLOSED FRACTURE: ICD-10-CM

## 2022-03-11 PROCEDURE — 99203 OFFICE O/P NEW LOW 30 MIN: CPT

## 2022-03-11 NOTE — HISTORY OF PRESENT ILLNESS
[FreeTextEntry1] : 36 year female presents for evaluation of her left thumb, s/p crush injury on 3/8/22.  Patient reports she closed her thumb into a car door.  She reports immediate pain and bruising of the left distal aspect of the thumb.  She also reports bleeding at the nail.  She was seen in urgent care where x-rays were taken revealing a tuft fracture.  The nailbed was repaired with suture and she was placed into a splint.  She reports continued mild bleeding at the nail.  She was placed on antibiotics and also provided oxycodone for pain control.  Patient denies taking the oxycodone but has been compliant with antibiotics.  She reports continued pain at the distal aspect of the thumb and mild loss of sensation.  She denies sign of infection.

## 2022-03-11 NOTE — ASSESSMENT
[FreeTextEntry1] : 36-year-old female status post crush injury to the left thumb with a tuft fracture along with laceration repair.\par Patient has been informed the findings and recommended for conservative treatment at this time.  She will keep her laceration clean dry and continue with dry dressings.  She has been provided with a splint to use for comfort.  She has been recommended for active range of motion of the digit to prevent stiffness.  She will follow up next week for reevaluation and suture removal.

## 2022-03-11 NOTE — PHYSICAL EXAM
[Normal Touch] : sensation intact for  touch [Normal] : no peripheral adenopathy appreciated [de-identified] : Left thumb:\par Skin intact, small laceration at the distal aspect of the nailbed intact with suture, no active bleeding today.  Nail plate intact, no sign of infection.  Moderate ecchymosis along the distal aspect of the thumb along with mild swelling.\par Range of motion of the thumb intact in flexion extension as well as opposition, range of motion reduced secondary to pain.  \par Positive tenderness to palpation along the distal phalanx.\par sensation intact distally, cap refill < 2 sec  [de-identified] : 3 x-ray views of the left thumb reviewed taken in urgent care.  I reviewed each radiograph in detail with the patient and discussed my findings as highlighted below. \par 3 x-ray views of the left thumb reveal a tuft fracture, no evidence of foreign body.

## 2022-03-18 ENCOUNTER — APPOINTMENT (OUTPATIENT)
Dept: ORTHOPEDIC SURGERY | Facility: CLINIC | Age: 37
End: 2022-03-18

## 2022-03-31 ENCOUNTER — NON-APPOINTMENT (OUTPATIENT)
Age: 37
End: 2022-03-31

## 2022-05-03 ENCOUNTER — APPOINTMENT (OUTPATIENT)
Dept: SURGERY | Facility: CLINIC | Age: 37
End: 2022-05-03
Payer: COMMERCIAL

## 2022-05-03 DIAGNOSIS — K11.8 OTHER DISEASES OF SALIVARY GLANDS: ICD-10-CM

## 2022-05-03 PROCEDURE — 99214 OFFICE O/P EST MOD 30 MIN: CPT

## 2022-05-03 NOTE — HISTORY OF PRESENT ILLNESS
[de-identified] : 1 year s/p left parotidectomy for PA.  denies any symptoms related to this.  recent MRI for w/u nasal discharge did not show any recurrent disease. no changes medically since last visit with exception of total 130 lb weight loss following bariatric surgery.  I have reviewed all old and new data and available images.\par

## 2022-05-03 NOTE — ASSESSMENT
[FreeTextEntry1] : will observe. to return earlier if any change. names of ENT and plastic surgeons given. patient has been given the opportunity to ask questions, and all of the patient's questions have been answered to their satisfaction\par

## 2022-06-15 NOTE — ED PROVIDER NOTE - NSFOLLOWUPINSTRUCTIONS_ED_ALL_ED_FT
Follow up with the hand specialist within 4 days.     You can take motrin/tylenol as needed for pain. Percocet sent to the pharmacy for you.     Begin taking the antibiotics tomorrow. Take them until they are all completed.     If you experience any new or worsening symptoms or if you are concerned you can always come back to the emergency for a re-evaluation. Klisyri Counseling:  I discussed with the patient the risks of Klisyri including but not limited to erythema, scaling, itching, weeping, crusting, and pain. Follow up with the hand specialist within 4 days. You suture will need to come out in 7-10 days. The hand surgeon should do this for you. Otherwise an ED or urgent care can.    You can take motrin/tylenol as needed for pain. Percocet sent to the pharmacy for you.     Begin taking the antibiotics tomorrow. Take them until they are all completed.     If you experience any new or worsening symptoms or if you are concerned you can always come back to the emergency for a re-evaluation.

## 2022-06-17 ENCOUNTER — NON-APPOINTMENT (OUTPATIENT)
Age: 37
End: 2022-06-17

## 2022-11-02 NOTE — H&P PST ADULT - LAST STRESS TEST
"Patient presents to the clinic for follow up with knee pain.    FOOD SECURITY SCREENING QUESTIONS:    The next two questions are to help us understand your food security.  If you are feeling you need any assistance in this area, we have resources available to support you today.    Hunger Vital Signs:  Within the past 12 months we worried whether our food would run out before we got money to buy more. Never  Within the past 12 months the food we bought just didn't last and we didn't have money to get more. Never    Advance Care Directive on file? no  Advance Care Directive provided to patient? Declined.    Chief Complaint   Patient presents with     Clinic Care Coordination - Follow-up     knee's       Initial BP (!) 154/100 (BP Location: Right arm, Patient Position: Sitting, Cuff Size: Adult Large)   Pulse 74   Temp 98.4  F (36.9  C) (Tympanic)   Resp 20   Wt 149.2 kg (329 lb)   SpO2 96%   BMI 41.12 kg/m   Estimated body mass index is 41.12 kg/m  as calculated from the following:    Height as of 8/25/22: 1.905 m (6' 3\").    Weight as of this encounter: 149.2 kg (329 lb).  Medication Reconciliation: complete        Oneida Malone LPN       "
2014 prior to having gastric sleeve

## 2023-01-12 NOTE — ED PROVIDER NOTE - SKIN [+], MLM
LACERATION Azelaic Acid Pregnancy And Lactation Text: This medication is considered safe during pregnancy and breast feeding.

## 2023-01-24 ENCOUNTER — APPOINTMENT (OUTPATIENT)
Dept: ULTRASOUND IMAGING | Facility: CLINIC | Age: 38
End: 2023-01-24
Payer: COMMERCIAL

## 2023-01-24 ENCOUNTER — OUTPATIENT (OUTPATIENT)
Dept: OUTPATIENT SERVICES | Facility: HOSPITAL | Age: 38
LOS: 1 days | End: 2023-01-24
Payer: COMMERCIAL

## 2023-01-24 ENCOUNTER — APPOINTMENT (OUTPATIENT)
Dept: MAMMOGRAPHY | Facility: CLINIC | Age: 38
End: 2023-01-24
Payer: COMMERCIAL

## 2023-01-24 DIAGNOSIS — Z98.84 BARIATRIC SURGERY STATUS: Chronic | ICD-10-CM

## 2023-01-24 DIAGNOSIS — Z98.890 OTHER SPECIFIED POSTPROCEDURAL STATES: Chronic | ICD-10-CM

## 2023-01-24 DIAGNOSIS — Z90.49 ACQUIRED ABSENCE OF OTHER SPECIFIED PARTS OF DIGESTIVE TRACT: Chronic | ICD-10-CM

## 2023-01-24 DIAGNOSIS — Z98.51 TUBAL LIGATION STATUS: Chronic | ICD-10-CM

## 2023-01-24 DIAGNOSIS — Z00.00 ENCOUNTER FOR GENERAL ADULT MEDICAL EXAMINATION WITHOUT ABNORMAL FINDINGS: ICD-10-CM

## 2023-01-24 PROCEDURE — 76641 ULTRASOUND BREAST COMPLETE: CPT | Mod: 26,50

## 2023-01-24 PROCEDURE — 77067 SCR MAMMO BI INCL CAD: CPT

## 2023-01-24 PROCEDURE — 77067 SCR MAMMO BI INCL CAD: CPT | Mod: 26

## 2023-01-24 PROCEDURE — 77063 BREAST TOMOSYNTHESIS BI: CPT | Mod: 26

## 2023-01-24 PROCEDURE — 76641 ULTRASOUND BREAST COMPLETE: CPT

## 2023-01-24 PROCEDURE — 77063 BREAST TOMOSYNTHESIS BI: CPT

## 2023-01-25 ENCOUNTER — TRANSCRIPTION ENCOUNTER (OUTPATIENT)
Age: 38
End: 2023-01-25

## 2023-06-02 NOTE — DISCHARGE NOTE OB - CARE PROVIDERS DIRECT ADDRESSES
Show Date Of Previous Biopsy Variable: Yes Suturegard Retention Suture: 2-0 Nylon Bone Involvement: No Provider Procedure Text (B): After obtaining clear surgical margins the defect was repaired by another provider. Perineural Invasion (For Histology - Be Specific If Possible): absent Banner Transposition Flap Text: The defect edges were debeveled with a #15 scalpel blade.  Given the location of the defect and the proximity to free margins a Banner transposition flap was deemed most appropriate.  Using a sterile surgical marker, an appropriate flap drawn around the defect. The area thus outlined was incised deep to adipose tissue with a #15 scalpel blade.  The skin margins were undermined to an appropriate distance in all directions utilizing iris scissors. Consent Type: Consent 1 (Standard) Repair Hemostasis (Optional): Biterminal bipolar electrocoagulation Crescentic Advancement Flap Text: The defect edges were debeveled with a #15 scalpel blade.  Given the location of the defect and the proximity to free margins a crescentic advancement flap was deemed most appropriate.  Using a sterile surgical marker, the appropriate advancement flap was drawn incorporating the defect and placing the expected incisions within the relaxed skin tension lines where possible.    The area thus outlined was incised deep to adipose tissue with a #15 scalpel blade.  The skin margins were undermined to an appropriate distance in all directions utilizing iris scissors. Rhombic Flap Text: The defect edges were debeveled with a #15 scalpel blade.  Given the location of the defect and the proximity to free margins a rhombic flap was deemed most appropriate.  Using a sterile surgical marker, an appropriate rhombic flap was drawn incorporating the defect.    The area thus outlined was incised deep to adipose tissue with a #15 scalpel blade.  The skin margins were undermined to an appropriate distance in all directions utilizing iris scissors. Post-Care Instructions: I reviewed with the patient in detail post-care instructions. Patient is not to engage in any heavy lifting, exercise, or swimming for the next 14 days. Should the patient develop any fevers, chills, bleeding, severe pain patient will contact the office immediately. Plastic Surgeon Procedure Text (B): After obtaining clear surgical margins the patient was sent to plastics for surgical repair.  The patient understands they will receive post-surgical care and follow-up from the referring physician's office. Primary Defect Width In Cm (Final Defect Size - Required For Flaps/Grafts): 1.1 Wound Care: Bacitracin Manual Repair Warning Statement: We plan on removing the manually selected variable below in favor of our much easier automatic structured text blocks found in the previous tab. We decided to do this to help make the flow better and give you the full power of structured data. Manual selection is never going to be ideal in our platform and I would encourage you to avoid using manual selection from this point on, especially since I will be sunsetting this feature. It is important that you do one of two things with the customized text below. First, you can save all of the text in a word file so you can have it for future reference. Second, transfer the text to the appropriate area in the Library tab. Lastly, if there is a flap or graft type which we do not have you need to let us know right away so I can add it in before the variable is hidden. No need to panic, we plan to give you roughly 6 months to make the change. Stage 15: Additional Anesthesia Type: 1% lidocaine with epinephrine Special Stains Stage 4 - Results: Base On Clearance Noted Above Mid-Level Procedure Text (F): After obtaining clear surgical margins the patient was sent to a mid-level provider for surgical repair.  The patient understands they will receive post-surgical care and follow-up from the mid-level provider. Complex Repair Preamble Text (Leave Blank If You Do Not Want): Extensive wide undermining was performed. Surgeon: Jason Garrett Stage 12: Number Of Blocks?: 0 Z Plasty Text: The lesion was extirpated to the level of the fat with a #15 scalpel blade.  Given the location of the defect, shape of the defect and the proximity to free margins a Z-plasty was deemed most appropriate for repair.  Using a sterile surgical marker, the appropriate transposition arms of the Z-plasty were drawn incorporating the defect and placing the expected incisions within the relaxed skin tension lines where possible.    The area thus outlined was incised deep to adipose tissue with a #15 scalpel blade.  The skin margins were undermined to an appropriate distance in all directions utilizing iris scissors.  The opposing transposition arms were then transposed into place in opposite direction and anchored with interrupted buried subcutaneous sutures. Island Pedicle Flap-Requiring Vessel Identification Text: The defect edges were debeveled with a #15 scalpel blade.  Given the location of the defect, shape of the defect and the proximity to free margins an island pedicle advancement flap was deemed most appropriate.  Using a sterile surgical marker, an appropriate advancement flap was drawn, based on the axial vessel mentioned above, incorporating the defect, outlining the appropriate donor tissue and placing the expected incisions within the relaxed skin tension lines where possible.    The area thus outlined was incised deep to adipose tissue with a #15 scalpel blade.  The skin margins were undermined to an appropriate distance in all directions around the primary defect and laterally outward around the island pedicle utilizing iris scissors.  There was minimal undermining beneath the pedicle flap. Localized Dermabrasion With Wire Brush Text: The patient was draped in routine manner.  Localized dermabrasion using 3 x 17 mm wire brush was performed in routine manner to papillary dermis. This spot dermabrasion is being performed to complete skin cancer reconstruction. It also will eliminate the other sun damaged precancerous cells that are known to be part of the regional effect of a lifetime's worth of sun exposure. This localized dermabrasion is therapeutic and should not be considered cosmetic in any regard. Referred To Oculoplastics For Closure Text (Leave Blank If You Do Not Want): After obtaining clear surgical margins the patient was sent to oculoplastics for surgical repair.  The patient understands they will receive post-surgical care and follow-up from the referring physician's office. No Residual Tumor Seen Histology Text: There were no malignant cells seen in the sections examined. Bilateral Rotation Flap Text: The defect edges were debeveled with a #15 scalpel blade. Given the location of the defect, shape of the defect and the proximity to free margins a bilateral rotation flap was deemed most appropriate. Using a sterile surgical marker, an appropriate rotation flap was drawn incorporating the defect and placing the expected incisions within the relaxed skin tension lines where possible. The area thus outlined was incised deep to adipose tissue with a #15 scalpel blade. The skin margins were undermined to an appropriate distance in all directions utilizing iris scissors. Following this, the designed flap was carried over into the primary defect and sutured into place. Suture Removal: 7 days Initial Size Of Lesion: 0.7 Consent (Marginal Mandibular)/Introductory Paragraph: The rationale for Mohs was explained to the patient and consent was obtained. The risks, benefits and alternatives to therapy were discussed in detail. Specifically, the risks of damage to the marginal mandibular branch of the facial nerve, infection, scarring, bleeding, prolonged wound healing, incomplete removal, allergy to anesthesia, and recurrence were addressed. Prior to the procedure, the treatment site was clearly identified and confirmed by the patient. All components of Universal Protocol/PAUSE Rule completed. Cheiloplasty (Less Than 50%) Text: A decision was made to reconstruct the defect with a  cheiloplasty.  The defect was undermined extensively.  Additional obicularis oris muscle was excised with a 15 blade scalpel.  The defect was converted into a full thickness wedge, of less than 50% of the vertical height of the lip, to facilite a better cosmetic result.  Small vessels were then tied off with 5-0 monocyrl. The obicularis oris, superficial fascia, adipose and dermis were then reapproximated.  After the deeper layers were approximated the epidermis was reapproximated with particular care given to realign the vermilion border. Otolaryngologist Procedure Text (F): After obtaining clear surgical margins the patient was sent to otolaryngology for surgical repair.  The patient understands they will receive post-surgical care and follow-up from the referring physician's office. Scc Histology Text: Atypical squamous epithelial cells Why Was The Change Made?: Please Select the Appropriate Response Detail Level: Detailed Area M Indication Text: Tumors in this location are included in Area M (cheek, forehead, scalp, neck, jawline and pretibial skin).  Mohs surgery is indicated for tumors in these anatomic locations. O-L Flap Text: The defect edges were debeveled with a #15 scalpel blade.  Given the location of the defect, shape of the defect and the proximity to free margins an O-L flap was deemed most appropriate.  Using a sterile surgical marker, an appropriate advancement flap was drawn incorporating the defect and placing the expected incisions within the relaxed skin tension lines where possible.    The area thus outlined was incised deep to adipose tissue with a #15 scalpel blade.  The skin margins were undermined to an appropriate distance in all directions utilizing iris scissors. Trilobed Flap Text: The defect edges were debeveled with a #15 scalpel blade.  Given the location of the defect and the proximity to free margins a trilobed flap was deemed most appropriate.  Using a sterile surgical marker, an appropriate trilobed flap drawn around the defect.    The area thus outlined was incised deep to adipose tissue with a #15 scalpel blade.  The skin margins were undermined to an appropriate distance in all directions utilizing iris scissors. Inflammation Suggestive Of Cancer Camouflage Histology Text: There was a dense lymphocytic infiltrate which prevented adequate histologic evaluation of adjacent structures. Helical Rim Advancement Flap Text: The defect edges were debeveled with a #15 blade scalpel.  Given the location of the defect and the proximity to free margins (helical rim) a double helical rim advancement flap was deemed most appropriate.  Using a sterile surgical marker, the appropriate advancement flaps were drawn incorporating the defect and placing the expected incisions between the helical rim and antihelix where possible.  The area thus outlined was incised through and through with a #15 scalpel blade.  With a skin hook and iris scissors, the flaps were gently and sharply undermined and freed up. Plastic Surgeon (A): Fabien Dumont MD Undermining Type: Entire Wound Asc Procedure Text (A): After obtaining clear surgical margins the patient was sent to an ASC for surgical repair.  The patient understands they will receive post-surgical care and follow-up from the ASC physician. Xenograft Text: The defect edges were debeveled with a #15 scalpel blade.  Given the location of the defect, shape of the defect and the proximity to free margins a xenograft was deemed most appropriate.  The graft was then trimmed to fit the size of the defect.  The graft was then placed in the primary defect and oriented appropriately. Pain Refusal Text: I offered to prescribe pain medication but the patient refused to take this medication. O-Z Plasty Text: The defect edges were debeveled with a #15 scalpel blade.  Given the location of the defect, shape of the defect and the proximity to free margins an O-Z plasty (double transposition flap) was deemed most appropriate.  Using a sterile surgical marker, the appropriate transposition flaps were drawn incorporating the defect and placing the expected incisions within the relaxed skin tension lines where possible.    The area thus outlined was incised deep to adipose tissue with a #15 scalpel blade.  The skin margins were undermined to an appropriate distance in all directions utilizing iris scissors.  Hemostasis was achieved with electrocautery.  The flaps were then transposed into place, one clockwise and the other counterclockwise, and anchored with interrupted buried subcutaneous sutures. Island Pedicle Flap With Canthal Suspension Text: The defect edges were debeveled with a #15 scalpel blade.  Given the location of the defect, shape of the defect and the proximity to free margins an island pedicle advancement flap was deemed most appropriate.  Using a sterile surgical marker, an appropriate advancement flap was drawn incorporating the defect, outlining the appropriate donor tissue and placing the expected incisions within the relaxed skin tension lines where possible. The area thus outlined was incised deep to adipose tissue with a #15 scalpel blade.  The skin margins were undermined to an appropriate distance in all directions around the primary defect and laterally outward around the island pedicle utilizing iris scissors.  There was minimal undermining beneath the pedicle flap. A suspension suture was placed in the canthal tendon to prevent tension and prevent ectropion. Graft Basting Suture (Optional): 7-0 Vicryl Star Wedge Flap Text: The defect edges were debeveled with a #15 scalpel blade.  Given the location of the defect, shape of the defect and the proximity to free margins a star wedge flap was deemed most appropriate.  Using a sterile surgical marker, an appropriate rotation flap was drawn incorporating the defect and placing the expected incisions within the relaxed skin tension lines where possible. The area thus outlined was incised deep to adipose tissue with a #15 scalpel blade.  The skin margins were undermined to an appropriate distance in all directions utilizing iris scissors. Suturegard Body: The suture ends were repeatedly re-tightened and re-clamped to achieve the desired tissue expansion. Where Do You Want The Question To Include Opioid Counseling Located?: Case Summary Tab Surgeon/Pathologist Verbiage (Will Incorporate Name Of Surgeon From Intro If Not Blank): operated in two distinct and integrated capacities as the surgeon and pathologist. Adjacent Tissue Transfer Text: The defect edges were debeveled with a #15 scalpel blade.  Given the location of the defect and the proximity to free margins an adjacent tissue transfer was deemed most appropriate.  Using a sterile surgical marker, an appropriate flap was drawn incorporating the defect and placing the expected incisions within the relaxed skin tension lines where possible.    The area thus outlined was incised deep to adipose tissue with a #15 scalpel blade.  The skin margins were undermined to an appropriate distance in all directions utilizing iris scissors. Repair Anesthesia Method: local infiltration Split-Thickness Skin Graft Text: The defect edges were debeveled with a #15 scalpel blade.  Given the location of the defect, shape of the defect and the proximity to free margins a split thickness skin graft was deemed most appropriate.  Using a sterile surgical marker, the primary defect shape was transferred to the donor site. The split thickness graft was then harvested.  The skin graft was then placed in the primary defect and oriented appropriately. Consent (Lip)/Introductory Paragraph: The rationale for Mohs was explained to the patient and consent was obtained. The risks, benefits and alternatives to therapy were discussed in detail. Specifically, the risks of lip deformity, changes in the oral aperture, infection, scarring, bleeding, prolonged wound healing, incomplete removal, allergy to anesthesia, nerve injury and recurrence were addressed. Prior to the procedure, the treatment site was clearly identified and confirmed by the patient. All components of Universal Protocol/PAUSE Rule completed. Complex Repair And Flap Additional Text (Will Appearing After The Standard Complex Repair Text): The complex repair was not sufficient to completely close the primary defect. The remaining additional defect was repaired with the flap mentioned below. Crescentic Intermediate Repair Preamble Text (Leave Blank If You Do Not Want): Undermining was performed with blunt dissection. Graft Donor Site Epidermal Sutures (Optional): 6-0 Nylon V-Y Flap Text: The defect edges were debeveled with a #15 scalpel blade.  Given the location of the defect, shape of the defect and the proximity to free margins a V-Y flap was deemed most appropriate.  Using a sterile surgical marker, an appropriate advancement flap was drawn incorporating the defect and placing the expected incisions within the relaxed skin tension lines where possible.    The area thus outlined was incised deep to adipose tissue with a #15 scalpel blade.  The skin margins were undermined to an appropriate distance in all directions utilizing iris scissors. Estlander Flap (Upper To Lower Lip) Text: The defect of the lower lip was assessed and measured.  Given the location and size of the defect, an Estlander flap was deemed most appropriate.  Using a sterile surgical marker, an appropriate Estlander flap was measured and drawn on the upper lip. Local anesthesia was then infiltrated. A scalpel was then used to incise the lateral aspect of the flap, through skin, muscle and mucosa, leaving the flap pedicled medially.  The flap was then rotated and positioned to fill the lower lip defect.  The flap was then sutured into place with a three layer technique, closing the orbicularis oris muscle layer with subcutaneous buried sutures, followed by a mucosal layer and an epidermal layer. Rhomboid Transposition Flap Text: The defect edges were debeveled with a #15 scalpel blade.  Given the location of the defect and the proximity to free margins a rhomboid transposition flap was deemed most appropriate.  Using a sterile surgical marker, an appropriate rhomboid flap was drawn incorporating the defect.    The area thus outlined was incised deep to adipose tissue with a #15 scalpel blade.  The skin margins were undermined to an appropriate distance in all directions utilizing iris scissors. Consent 3/Introductory Paragraph: I gave the patient a chance to ask questions they had about the procedure.  Following this I explained the Mohs procedure and consent was obtained. The risks, benefits and alternatives to therapy were discussed in detail. Specifically, the risks of infection, scarring, bleeding, prolonged wound healing, incomplete removal, allergy to anesthesia, nerve injury and recurrence were addressed. Prior to the procedure, the treatment site was clearly identified and confirmed by the patient. All components of Universal Protocol/PAUSE Rule completed. Mustarde Flap Text: The defect edges were debeveled with a #15 scalpel blade.  Given the size, depth and location of the defect and the proximity to free margins a Mustarde flap was deemed most appropriate.  Using a sterile surgical marker, an appropriate flap was drawn incorporating the defect. The area thus outlined was incised with a #15 scalpel blade.  The skin margins were undermined to an appropriate distance in all directions utilizing iris scissors. Epidermal Closure: simple interrupted ,DirectAddress_Unknown Postop Diagnosis: same Home Suture Removal Text: Patient was provided instructions on removing sutures and will remove their sutures at home.  If they have any questions or difficulties they will call the office. Number Of Stages: 1 Skin Substitute Text: The defect edges were debeveled with a #15 scalpel blade.  Given the location of the defect, shape of the defect and the proximity to free margins a skin substitute graft was deemed most appropriate.  The graft material was trimmed to fit the size of the defect. The graft was then placed in the primary defect and oriented appropriately. Hemostasis: Electrocautery Bilateral Helical Rim Advancement Flap Text: The defect edges were debeveled with a #15 blade scalpel.  Given the location of the defect and the proximity to free margins (helical rim) a bilateral helical rim advancement flap was deemed most appropriate.  Using a sterile surgical marker, the appropriate advancement flaps were drawn incorporating the defect and placing the expected incisions between the helical rim and antihelix where possible.  The area thus outlined was incised through and through with a #15 scalpel blade.  With a skin hook and iris scissors, the flaps were gently and sharply undermined and freed up. Burow's Advancement Flap Text: The defect edges were debeveled with a #15 scalpel blade.  Given the location of the defect and the proximity to free margins a Burow's advancement flap was deemed most appropriate.  Using a sterile surgical marker, the appropriate advancement flap was drawn incorporating the defect and placing the expected incisions within the relaxed skin tension lines where possible.    The area thus outlined was incised deep to adipose tissue with a #15 scalpel blade.  The skin margins were undermined to an appropriate distance in all directions utilizing iris scissors. Cheek-To-Nose Interpolation Flap Text: A decision was made to reconstruct the defect utilizing an interpolation axial flap and a staged reconstruction.  A telfa template was made of the defect.  This telfa template was then used to outline the Cheek-To-Nose Interpolation flap.  The donor area for the pedicle flap was then injected with anesthesia.  The flap was excised through the skin and subcutaneous tissue down to the layer of the underlying musculature.  The interpolation flap was carefully excised within this deep plane to maintain its blood supply.  The edges of the donor site were undermined.   The donor site was closed in a primary fashion.  The pedicle was then rotated into position and sutured.  Once the tube was sutured into place, adequate blood supply was confirmed with blanching and refill.  The pedicle was then wrapped with xeroform gauze and dressed appropriately with a telfa and gauze bandage to ensure continued blood supply and protect the attached pedicle. Simple / Intermediate / Complex Repair - Final Wound Length In Cm: 3 Modified Advancement Flap Text: The defect edges were debeveled with a #15 scalpel blade.  Given the location of the defect, shape of the defect and the proximity to free margins a modified advancement flap was deemed most appropriate.  Using a sterile surgical marker, an appropriate advancement flap was drawn incorporating the defect and placing the expected incisions within the relaxed skin tension lines where possible.    The area thus outlined was incised deep to adipose tissue with a #15 scalpel blade.  The skin margins were undermined to an appropriate distance in all directions utilizing iris scissors. Depth Of Tumor Invasion (For Histology): tumor not visualized (deep and peripheral margins are clear of tumor) Subsequent Stages Histo Method Verbiage: Using a similar technique to that described above, a thin layer of tissue was removed from all areas where tumor was visible on the previous stage.  The tissue was again oriented, mapped, dyed, and processed as above. Intermediate Repair And Flap Additional Text (Will Appearing After The Standard Complex Repair Text): The intermediate repair was not sufficient to completely close the primary defect. The remaining additional defect was repaired with the flap mentioned below. Orbicularis Oris Muscle Flap Text: The defect edges were debeveled with a #15 scalpel blade.  Given that the defect affected the competency of the oral sphincter an obicularis oris muscle flap was deemed most appropriate to restore this competency and normal muscle function.  Using a sterile surgical marker, an appropriate flap was drawn incorporating the defect. The area thus outlined was incised with a #15 scalpel blade. Tumor Depth: Less than 6mm from granular layer and no invasion beyond the subcutaneous fat Consent (Ear)/Introductory Paragraph: The rationale for Mohs was explained to the patient and consent was obtained. The risks, benefits and alternatives to therapy were discussed in detail. Specifically, the risks of ear deformity, infection, scarring, bleeding, prolonged wound healing, incomplete removal, allergy to anesthesia, nerve injury and recurrence were addressed. Prior to the procedure, the treatment site was clearly identified and confirmed by the patient. All components of Universal Protocol/PAUSE Rule completed. Full Thickness Lip Wedge Repair (Flap) Text: Given the location of the defect and the proximity to free margins a full thickness wedge repair was deemed most appropriate.  Using a sterile surgical marker, the appropriate repair was drawn incorporating the defect and placing the expected incisions perpendicular to the vermilion border.  The vermilion border was also meticulously outlined to ensure appropriate reapproximation during the repair.  The area thus outlined was incised through and through with a #15 scalpel blade.  The muscularis and dermis were reaproximated with deep sutures following hemostasis. Care was taken to realign the vermilion border before proceeding with the superficial closure.  Once the vermilion was realigned the superfical and mucosal closure was finished. Transposition Flap Text: The defect edges were debeveled with a #15 scalpel blade.  Given the location of the defect and the proximity to free margins a transposition flap was deemed most appropriate.  Using a sterile surgical marker, an appropriate transposition flap was drawn incorporating the defect.    The area thus outlined was incised deep to adipose tissue with a #15 scalpel blade.  The skin margins were undermined to an appropriate distance in all directions utilizing iris scissors. Secondary Intention Text (Leave Blank If You Do Not Want): The defect will heal with secondary intention. Abbe Flap (Upper To Lower Lip) Text: The defect of the lower lip was assessed and measured.  Given the location and size of the defect, an Abbe flap was deemed most appropriate.  Using a sterile surgical marker, an appropriate Abbe flap was measured and drawn on the upper lip. Local anesthesia was then infiltrated.  A scalpel was then used to incise the upper lip through and through the skin, vermilion, muscle and mucosa, leaving the flap pedicled on the opposite side.  The flap was then rotated and transferred to the lower lip defect.  The flap was then sutured into place with a three layer technique, closing the orbicularis oris muscle layer with subcutaneous buried sutures, followed by a mucosal layer and an epidermal layer. Bcc Infiltrative Histology Text: There were numerous aggregates of basaloid cells demonstrating an infiltrative pattern. A-T Advancement Flap Text: The defect edges were debeveled with a #15 scalpel blade.  Given the location of the defect, shape of the defect and the proximity to free margins an A-T advancement flap was deemed most appropriate.  Using a sterile surgical marker, an appropriate advancement flap was drawn incorporating the defect and placing the expected incisions within the relaxed skin tension lines where possible.    The area thus outlined was incised deep to adipose tissue with a #15 scalpel blade.  The skin margins were undermined to an appropriate distance in all directions utilizing iris scissors. Donor Site Anesthesia Type: same as repair anesthesia W Plasty Text: The lesion was extirpated to the level of the fat with a #15 scalpel blade.  Given the location of the defect, shape of the defect and the proximity to free margins a W-plasty was deemed most appropriate for repair.  Using a sterile surgical marker, the appropriate transposition arms of the W-plasty were drawn incorporating the defect and placing the expected incisions within the relaxed skin tension lines where possible.    The area thus outlined was incised deep to adipose tissue with a #15 scalpel blade.  The skin margins were undermined to an appropriate distance in all directions utilizing iris scissors.  The opposing transposition arms were then transposed into place in opposite direction and anchored with interrupted buried subcutaneous sutures. Closure 4 Information: This tab is for additional flaps and grafts above and beyond our usual structured repairs.  Please note if you enter information here it will not currently bill and you will need to add the billing information manually. Alar Island Pedicle Flap Text: The defect edges were debeveled with a #15 scalpel blade.  Given the location of the defect, shape of the defect and the proximity to the alar rim an island pedicle advancement flap was deemed most appropriate.  Using a sterile surgical marker, an appropriate advancement flap was drawn incorporating the defect, outlining the appropriate donor tissue and placing the expected incisions within the nasal ala running parallel to the alar rim. The area thus outlined was incised with a #15 scalpel blade.  The skin margins were undermined minimally to an appropriate distance in all directions around the primary defect and laterally outward around the island pedicle utilizing iris scissors.  There was minimal undermining beneath the pedicle flap. Double O-Z Plasty Text: The defect edges were debeveled with a #15 scalpel blade.  Given the location of the defect, shape of the defect and the proximity to free margins a Double O-Z plasty (double transposition flap) was deemed most appropriate.  Using a sterile surgical marker, the appropriate transposition flaps were drawn incorporating the defect and placing the expected incisions within the relaxed skin tension lines where possible. The area thus outlined was incised deep to adipose tissue with a #15 scalpel blade.  The skin margins were undermined to an appropriate distance in all directions utilizing iris scissors.  Hemostasis was achieved with electrocautery.  The flaps were then transposed into place, one clockwise and the other counterclockwise, and anchored with interrupted buried subcutaneous sutures. Epidermal Autograft Text: The defect edges were debeveled with a #15 scalpel blade.  Given the location of the defect, shape of the defect and the proximity to free margins an epidermal autograft was deemed most appropriate.  Using a sterile surgical marker, the primary defect shape was transferred to the donor site. The epidermal graft was then harvested.  The skin graft was then placed in the primary defect and oriented appropriately. Bilobed Flap Text: The defect edges were debeveled with a #15 scalpel blade.  Given the location of the defect and the proximity to free margins a bilobe flap was deemed most appropriate.  Using a sterile surgical marker, an appropriate bilobe flap drawn around the defect.    The area thus outlined was incised deep to adipose tissue with a #15 scalpel blade.  The skin margins were undermined to an appropriate distance in all directions utilizing iris scissors. Hatchet Flap Text: The defect edges were debeveled with a #15 scalpel blade.  Given the location of the defect, shape of the defect and the proximity to free margins a hatchet flap was deemed most appropriate.  Using a sterile surgical marker, an appropriate hatchet flap was drawn incorporating the defect and placing the expected incisions within the relaxed skin tension lines where possible.    The area thus outlined was incised deep to adipose tissue with a #15 scalpel blade.  The skin margins were undermined to an appropriate distance in all directions utilizing iris scissors. Consent 1/Introductory Paragraph: The rationale for Mohs was explained to the patient and consent was obtained. The risks, benefits and alternatives to therapy were discussed in detail. Specifically, the risks of infection, scarring, bleeding, prolonged wound healing, incomplete removal, allergy to anesthesia, nerve injury and recurrence were addressed. Prior to the procedure, the treatment site was clearly identified and confirmed by the patient. All components of Universal Protocol/PAUSE Rule completed. Partial Purse String (Simple) Text: Given the location of the defect and the characteristics of the surrounding skin a simple purse string closure was deemed most appropriate.  Undermining was performed circumfirentially around the surgical defect.  A purse string suture was then placed and tightened. Wound tension only allowed a partial closure of the circular defect. Staging Info: By selecting yes to the question above you will include information on AJCC 8 tumor staging in your Mohs note. Information on tumor staging will be automatically added for SCCs on the head and neck. AJCC 8 includes tumor size, tumor depth, perineural involvement and bone invasion. Mart-1 - Positive Histology Text: MART-1 staining demonstrates areas of higher density and clustering of melanocytes with Pagetoid spread upwards within the epidermis. The surgical margins are positive for tumor cells. Mohs Case Number: [id number] Mart-1 - Negative Histology Text: MART-1 staining demonstrates a normal density and pattern of melanocytes along the dermal-epidermal junction. The surgical margins are negative for tumor cells. Burow's Graft Text: The defect edges were debeveled with a #15 scalpel blade.  Given the location of the defect, shape of the defect, the proximity to free margins and the presence of a standing cone deformity a Burow's skin graft was deemed most appropriate. The standing cone was removed and this tissue was then trimmed to the shape of the primary defect. The adipose tissue was also removed until only dermis and epidermis were left.  The skin margins of the secondary defect were undermined to an appropriate distance in all directions utilizing iris scissors.  The secondary defect was closed with interrupted buried subcutaneous sutures.  The skin edges were then re-apposed with running  sutures.  The skin graft was then placed in the primary defect and oriented appropriately. Suturegard Intro: Intraoperative tissue expansion was performed, utilizing the SUTUREGARD device, in order to reduce wound tension. Nasal Turnover Hinge Flap Text: The defect edges were debeveled with a #15 scalpel blade.  Given the size, depth, location of the defect and the defect being full thickness a nasal turnover hinge flap was deemed most appropriate.  Using a sterile surgical marker, an appropriate hinge flap was drawn incorporating the defect. The area thus outlined was incised with a #15 scalpel blade. The flap was designed to recreate the nasal mucosal lining and the alar rim. The skin margins were undermined to an appropriate distance in all directions utilizing iris scissors. Nostril Rim Text: The closure involved the nostril rim. Dorsal Nasal Flap Text: The defect edges were debeveled with a #15 scalpel blade.  Given the location of the defect and the proximity to free margins a dorsal nasal flap was deemed most appropriate.  Using a sterile surgical marker, an appropriate dorsal nasal flap was drawn around the defect.    The area thus outlined was incised deep to adipose tissue with a #15 scalpel blade.  The skin margins were undermined to an appropriate distance in all directions utilizing iris scissors. O-Z Flap Text: The defect edges were debeveled with a #15 scalpel blade.  Given the location of the defect, shape of the defect and the proximity to free margins an O-Z flap was deemed most appropriate.  Using a sterile surgical marker, an appropriate transposition flap was drawn incorporating the defect and placing the expected incisions within the relaxed skin tension lines where possible. The area thus outlined was incised deep to adipose tissue with a #15 scalpel blade.  The skin margins were undermined to an appropriate distance in all directions utilizing iris scissors. Cartilage Graft Text: The defect edges were debeveled with a #15 scalpel blade.  Given the location of the defect, shape of the defect, the fact the defect involved a full thickness cartilage defect a cartilage graft was deemed most appropriate.  An appropriate donor site was identified, cleansed, and anesthetized. The cartilage graft was then harvested and transferred to the recipient site, oriented appropriately and then sutured into place.  The secondary defect was then repaired using a primary closure. Bcc Histology Text: There were numerous aggregates of basaloid cells. Hemigard Postcare Instructions: The HEMIGARD strips are to remain completely dry for at least 5-7 days. Area L Indication Text: Tumors in this location are included in Area L (trunk and extremities).  Mohs surgery is indicated for larger tumors, or tumors with aggressive histologic features, in these anatomic locations. Spiral Flap Text: The defect edges were debeveled with a #15 scalpel blade.  Given the location of the defect, shape of the defect and the proximity to free margins a spiral flap was deemed most appropriate.  Using a sterile surgical marker, an appropriate rotation flap was drawn incorporating the defect and placing the expected incisions within the relaxed skin tension lines where possible. The area thus outlined was incised deep to adipose tissue with a #15 scalpel blade.  The skin margins were undermined to an appropriate distance in all directions utilizing iris scissors. Epidermal Sutures: mastisol and steri-strips Mastoid Interpolation Flap Text: A decision was made to reconstruct the defect utilizing an interpolation axial flap and a staged reconstruction.  A telfa template was made of the defect.  This telfa template was then used to outline the mastoid interpolation flap.  The donor area for the pedicle flap was then injected with anesthesia.  The flap was excised through the skin and subcutaneous tissue down to the layer of the underlying musculature.  The pedicle flap was carefully excised within this deep plane to maintain its blood supply.  The edges of the donor site were undermined.   The donor site was closed in a primary fashion.  The pedicle was then rotated into position and sutured.  Once the tube was sutured into place, adequate blood supply was confirmed with blanching and refill.  The pedicle was then wrapped with xeroform gauze and dressed appropriately with a telfa and gauze bandage to ensure continued blood supply and protect the attached pedicle. Keystone Flap Text: The defect edges were debeveled with a #15 scalpel blade.  Given the location of the defect, shape of the defect a keystone flap was deemed most appropriate.  Using a sterile surgical marker, an appropriate keystone flap was drawn incorporating the defect, outlining the appropriate donor tissue and placing the expected incisions within the relaxed skin tension lines where possible. The area thus outlined was incised deep to adipose tissue with a #15 scalpel blade.  The skin margins were undermined to an appropriate distance in all directions around the primary defect and laterally outward around the flap utilizing iris scissors. Deep Sutures: 5-0 Vicryl Posterior Auricular Interpolation Flap Text: A decision was made to reconstruct the defect utilizing an interpolation axial flap and a staged reconstruction.  A telfa template was made of the defect.  This telfa template was then used to outline the posterior auricular interpolation flap.  The donor area for the pedicle flap was then injected with anesthesia.  The flap was excised through the skin and subcutaneous tissue down to the layer of the underlying musculature.  The pedicle flap was carefully excised within this deep plane to maintain its blood supply.  The edges of the donor site were undermined.   The donor site was closed in a primary fashion.  The pedicle was then rotated into position and sutured.  Once the tube was sutured into place, adequate blood supply was confirmed with blanching and refill.  The pedicle was then wrapped with xeroform gauze and dressed appropriately with a telfa and gauze bandage to ensure continued blood supply and protect the attached pedicle. Graft Donor Site Bandage (Optional-Leave Blank If You Don't Want In Note): Steri-strips and a pressure bandage were applied to the donor site. Medical Necessity Statement: Based on my medical judgement, Mohs surgery is the most appropriate treatment for this cancer compared to other treatments. Cheiloplasty (Complex) Text: A decision was made to reconstruct the defect with a  cheiloplasty.  The defect was undermined extensively.  Additional obicularis oris muscle was excised with a 15 blade scalpel.  The defect was converted into a full thickness wedge to facilite a better cosmetic result.  Small vessels were then tied off with 5-0 monocyrl. The obicularis oris, superficial fascia, adipose and dermis were then reapproximated.  After the deeper layers were approximated the epidermis was reapproximated with particular care given to realign the vermilion border. Consent (Spinal Accessory)/Introductory Paragraph: The rationale for Mohs was explained to the patient and consent was obtained. The risks, benefits and alternatives to therapy were discussed in detail. Specifically, the risks of damage to the spinal accessory nerve, infection, scarring, bleeding, prolonged wound healing, incomplete removal, allergy to anesthesia, and recurrence were addressed. Prior to the procedure, the treatment site was clearly identified and confirmed by the patient. All components of Universal Protocol/PAUSE Rule completed. Melolabial Transposition Flap Text: The defect edges were debeveled with a #15 scalpel blade.  Given the location of the defect and the proximity to free margins a melolabial flap was deemed most appropriate.  Using a sterile surgical marker, an appropriate melolabial transposition flap was drawn incorporating the defect.    The area thus outlined was incised deep to adipose tissue with a #15 scalpel blade.  The skin margins were undermined to an appropriate distance in all directions utilizing iris scissors. Purse String (Simple) Text: Given the location of the defect and the characteristics of the surrounding skin a pursestring closure was deemed most appropriate.  Undermining was performed circumfirentially around the surgical defect.  A purstring suture was then placed and tightened. Mauc Instructions: By selecting yes to the question below the MAUC number will be added into the note.  This will be calculated automatically based on the diagnosis chosen, the size entered, the body zone selected (H,M,L) and the specific indications you chose. You will also have the option to override the Mohs AUC if you disagree with the automatically calculated number and this option is found in the Case Summary tab. Same Histology In Subsequent Stages Text: The pattern and morphology of the tumor is as described in the first stage. Surgeon Performing Repair (Optional): Jason Garrett MD Melolabial Interpolation Flap Text: A decision was made to reconstruct the defect utilizing an interpolation axial flap and a staged reconstruction.  A telfa template was made of the defect.  This telfa template was then used to outline the melolabial interpolation flap.  The donor area for the pedicle flap was then injected with anesthesia.  The flap was excised through the skin and subcutaneous tissue down to the layer of the underlying musculature.  The pedicle flap was carefully excised within this deep plane to maintain its blood supply.  The edges of the donor site were undermined.   The donor site was closed in a primary fashion.  The pedicle was then rotated into position and sutured.  Once the tube was sutured into place, adequate blood supply was confirmed with blanching and refill.  The pedicle was then wrapped with xeroform gauze and dressed appropriately with a telfa and gauze bandage to ensure continued blood supply and protect the attached pedicle. Complex Repair And Graft Additional Text (Will Appearing After The Standard Complex Repair Text): The complex repair was not sufficient to completely close the primary defect. The remaining additional defect was repaired with the graft mentioned below. Rotation Flap Text: The defect edges were debeveled with a #15 scalpel blade.  Given the location of the defect, shape of the defect and the proximity to free margins a rotation flap was deemed most appropriate.  Using a sterile surgical marker, an appropriate rotation flap was drawn incorporating the defect and placing the expected incisions within the relaxed skin tension lines where possible.    The area thus outlined was incised deep to adipose tissue with a #15 scalpel blade.  The skin margins were undermined to an appropriate distance in all directions utilizing iris scissors. Debridement Text: The wound edges were debrided prior to proceeding with the closure to facilitate wound healing. Length To Time In Minutes Device Was In Place: 10 Bi-Rhombic Flap Text: The defect edges were debeveled with a #15 scalpel blade.  Given the location of the defect and the proximity to free margins a bi-rhombic flap was deemed most appropriate.  Using a sterile surgical marker, an appropriate rhombic flap was drawn incorporating the defect. The area thus outlined was incised deep to adipose tissue with a #15 scalpel blade.  The skin margins were undermined to an appropriate distance in all directions utilizing iris scissors. Advancement Flap (Single) Text: The defect edges were debeveled with a #15 scalpel blade.  Given the location of the defect and the proximity to free margins a single advancement flap was deemed most appropriate.  Using a sterile surgical marker, an appropriate advancement flap was drawn incorporating the defect and placing the expected incisions within the relaxed skin tension lines where possible.    The area thus outlined was incised deep to adipose tissue with a #15 scalpel blade.  The skin margins were undermined to an appropriate distance in all directions utilizing iris scissors. Consent (Scalp)/Introductory Paragraph: The rationale for Mohs was explained to the patient and consent was obtained. The risks, benefits and alternatives to therapy were discussed in detail. Specifically, the risks of changes in hair growth pattern secondary to repair, infection, scarring, bleeding, prolonged wound healing, incomplete removal, allergy to anesthesia, nerve injury and recurrence were addressed. Prior to the procedure, the treatment site was clearly identified and confirmed by the patient. All components of Universal Protocol/PAUSE Rule completed. Brow Lift Text: A midfrontal incision was made medially to the defect to allow access to the tissues just superior to the left eyebrow. Following careful dissection inferiorly in a supraperiosteal plane to the level of the left eyebrow, several 3-0 monocryl sutures were used to resuspend the eyebrow orbicularis oculi muscular unit to the superior frontal bone periosteum. This resulted in an appropriate reapproximation of static eyebrow symmetry and correction of the left brow ptosis. Advancement-Rotation Flap Text: The defect edges were debeveled with a #15 scalpel blade.  Given the location of the defect, shape of the defect and the proximity to free margins an advancement-rotation flap was deemed most appropriate.  Using a sterile surgical marker, an appropriate flap was drawn incorporating the defect and placing the expected incisions within the relaxed skin tension lines where possible. The area thus outlined was incised deep to adipose tissue with a #15 scalpel blade.  The skin margins were undermined to an appropriate distance in all directions utilizing iris scissors. V-Y Plasty Text: The defect edges were debeveled with a #15 scalpel blade.  Given the location of the defect, shape of the defect and the proximity to free margins an V-Y advancement flap was deemed most appropriate.  Using a sterile surgical marker, an appropriate advancement flap was drawn incorporating the defect and placing the expected incisions within the relaxed skin tension lines where possible.    The area thus outlined was incised deep to adipose tissue with a #15 scalpel blade.  The skin margins were undermined to an appropriate distance in all directions utilizing iris scissors. Bilobed Transposition Flap Text: The defect edges were debeveled with a #15 scalpel blade.  Given the location of the defect and the proximity to free margins a bilobed transposition flap was deemed most appropriate.  Using a sterile surgical marker, an appropriate bilobe flap drawn around the defect.    The area thus outlined was incised deep to adipose tissue with a #15 scalpel blade.  The skin margins were undermined to an appropriate distance in all directions utilizing iris scissors. O-T Advancement Flap Text: The defect edges were debeveled with a #15 scalpel blade.  Given the location of the defect, shape of the defect and the proximity to free margins an O-T advancement flap was deemed most appropriate.  Using a sterile surgical marker, an appropriate advancement flap was drawn incorporating the defect and placing the expected incisions within the relaxed skin tension lines where possible.    The area thus outlined was incised deep to adipose tissue with a #15 scalpel blade.  The skin margins were undermined to an appropriate distance in all directions utilizing iris scissors. Mohs Histo Method Verbiage: Each section was then chromacoded and processed in the Mohs lab using the Mohs protocol and submitted for frozen section. Dressing: dry sterile dressing Tissue Cultured Epidermal Autograft Text: The defect edges were debeveled with a #15 scalpel blade.  Given the location of the defect, shape of the defect and the proximity to free margins a tissue cultured epidermal autograft was deemed most appropriate.  The graft was then trimmed to fit the size of the defect.  The graft was then placed in the primary defect and oriented appropriately. Consent (Temporal Branch)/Introductory Paragraph: The rationale for Mohs was explained to the patient and consent was obtained. The risks, benefits and alternatives to therapy were discussed in detail. Specifically, the risks of damage to the temporal branch of the facial nerve, infection, scarring, bleeding, prolonged wound healing, incomplete removal, allergy to anesthesia, and recurrence were addressed. Prior to the procedure, the treatment site was clearly identified and confirmed by the patient. All components of Universal Protocol/PAUSE Rule completed. Non-Graft Cartilage Fenestration Text: The cartilage was fenestrated with a 2mm punch biopsy to help facilitate healing. Staged Advancement Flap Text: The defect edges were debeveled with a #15 scalpel blade.  Given the location of the defect, shape of the defect and the proximity to free margins a staged advancement flap was deemed most appropriate.  Using a sterile surgical marker, an appropriate advancement flap was drawn incorporating the defect and placing the expected incisions within the relaxed skin tension lines where possible. The area thus outlined was incised deep to adipose tissue with a #15 scalpel blade.  The skin margins were undermined to an appropriate distance in all directions utilizing iris scissors. Mucosal Advancement Flap Text: Given the location of the defect, shape of the defect and the proximity to free margins a mucosal advancement flap was deemed most appropriate. Incisions were made with a 15 blade scalpel in the appropriate fashion along the cutaneous vermilion border and the mucosal lip. The remaining actinically damaged mucosal tissue was excised.  The mucosal advancement flap was then elevated to the gingival sulcus with care taken to preserve the neurovascular structures and advanced into the primary defect. Care was taken to ensure that precise realignment of the vermilion border was achieved. Eye Protection Verbiage: Before proceeding with the stage, a plastic scleral shield was inserted. The globe was anesthetized with a few drops of 1% lidocaine with 1:100,000 epinephrine. Then, an appropriate sized scleral shield was chosen and coated with lacrilube ointment. The shield was gently inserted and left in place for the duration of each stage. After the stage was completed, the shield was gently removed. Hemigard Intro: Due to skin fragility and wound tension, it was decided to use HEMIGARD adhesive retention suture devices to permit a linear closure. The skin was cleaned and dried for a 6cm distance away from the wound. Excessive hair, if present, was removed to allow for adhesion. Retention Suture Bite Size: 3 mm Ear Star Wedge Flap Text: The defect edges were debeveled with a #15 blade scalpel.  Given the location of the defect and the proximity to free margins (helical rim) an ear star wedge flap was deemed most appropriate.  Using a sterile surgical marker, the appropriate flap was drawn incorporating the defect and placing the expected incisions between the helical rim and antihelix where possible.  The area thus outlined was incised through and through with a #15 scalpel blade. Double O-Z Flap Text: The defect edges were debeveled with a #15 scalpel blade.  Given the location of the defect, shape of the defect and the proximity to free margins a Double O-Z flap was deemed most appropriate.  Using a sterile surgical marker, an appropriate transposition flap was drawn incorporating the defect and placing the expected incisions within the relaxed skin tension lines where possible. The area thus outlined was incised deep to adipose tissue with a #15 scalpel blade.  The skin margins were undermined to an appropriate distance in all directions utilizing iris scissors. Bcc  Nodular Histology Text: Basaloid cells with clefting Chonodrocutaneous Helical Advancement Flap Text: The defect edges were debeveled with a #15 scalpel blade.  Given the location of the defect and the proximity to free margins a chondrocutaneous helical advancement flap was deemed most appropriate.  Using a sterile surgical marker, the appropriate advancement flap was drawn incorporating the defect and placing the expected incisions within the relaxed skin tension lines where possible.    The area thus outlined was incised deep to adipose tissue with a #15 scalpel blade.  The skin margins were undermined to an appropriate distance in all directions utilizing iris scissors. H Plasty Text: Given the location of the defect, shape of the defect and the proximity to free margins a H-plasty was deemed most appropriate for repair.  Using a sterile surgical marker, the appropriate advancement arms of the H-plasty were drawn incorporating the defect and placing the expected incisions within the relaxed skin tension lines where possible. The area thus outlined was incised deep to adipose tissue with a #15 scalpel blade. The skin margins were undermined to an appropriate distance in all directions utilizing iris scissors.  The opposing advancement arms were then advanced into place in opposite direction and anchored with interrupted buried subcutaneous sutures. Island Pedicle Flap Text: The defect edges were debeveled with a #15 scalpel blade.  Given the location of the defect, shape of the defect and the proximity to free margins an island pedicle advancement flap was deemed most appropriate.  Using a sterile surgical marker, an appropriate advancement flap was drawn incorporating the defect, outlining the appropriate donor tissue and placing the expected incisions within the relaxed skin tension lines where possible.    The area thus outlined was incised deep to adipose tissue with a #15 scalpel blade.  The skin margins were undermined to an appropriate distance in all directions around the primary defect and laterally outward around the island pedicle utilizing iris scissors.  There was minimal undermining beneath the pedicle flap. Mohs Rapid Report Verbiage: The area of clinically evident tumor was marked with skin marking ink and appropriately hatched.  The initial incision was made following the Mohs approach through the skin.  The specimen was taken to the lab, divided into the necessary number of pieces, chromacoded and processed according to the Mohs protocol.  This was repeated in successive stages until a tumor free defect was achieved. Zygomaticofacial Flap Text: Given the location of the defect, shape of the defect and the proximity to free margins a zygomaticofacial flap was deemed most appropriate for repair.  Using a sterile surgical marker, the appropriate flap was drawn incorporating the defect and placing the expected incisions within the relaxed skin tension lines where possible. The area thus outlined was incised deep to adipose tissue with a #15 scalpel blade with preservation of a vascular pedicle.  The skin margins were undermined to an appropriate distance in all directions utilizing iris scissors.  The flap was then placed into the defect and anchored with interrupted buried subcutaneous sutures. Mohs Method Verbiage: An incision at a 45 degree angle following the standard Mohs approach was done and the specimen was harvested as a microscopic controlled layer. Vermilion Border Text: The closure involved the vermilion border. Intermediate Repair And Graft Additional Text (Will Appearing After The Standard Complex Repair Text): The intermediate repair was not sufficient to completely close the primary defect. The remaining additional defect was repaired with the graft mentioned below. Retention Suture Text: Retention sutures were placed to support the closure and prevent dehiscence. Muscle Hinge Flap Text: The defect edges were debeveled with a #15 scalpel blade.  Given the size, depth and location of the defect and the proximity to free margins a muscle hinge flap was deemed most appropriate.  Using a sterile surgical marker, an appropriate hinge flap was drawn incorporating the defect. The area thus outlined was incised with a #15 scalpel blade.  The skin margins were undermined to an appropriate distance in all directions utilizing iris scissors. Abbe Flap (Lower To Upper Lip) Text: The defect of the upper lip was assessed and measured.  Given the location and size of the defect, an Abbe flap was deemed most appropriate.  Using a sterile surgical marker, an appropriate Abbe flap was measured and drawn on the lower lip. Local anesthesia was then infiltrated. A scalpel was then used to incise the upper lip through and through the skin, vermilion, muscle and mucosa, leaving the flap pedicled on the opposite side.  The flap was then rotated and transferred to the lower lip defect.  The flap was then sutured into place with a three layer technique, closing the orbicularis oris muscle layer with subcutaneous buried sutures, followed by a mucosal layer and an epidermal layer. No Repair - Repaired With Adjacent Surgical Defect Text (Leave Blank If You Do Not Want): After obtaining clear surgical margins the defect was repaired concurrently with another surgical defect which was in close approximation. Closure 2 Information: This tab is for additional flaps and grafts, including complex repair and grafts and complex repair and flaps. You can also specify a different location for the additional defect, if the location is the same you do not need to select a new one. We will insert the automated text for the repair you select below just as we do for solitary flaps and grafts. Please note that at this time if you select a location with a different insurance zone you will need to override the ICD10 and CPT if appropriate. Area H Indication Text: Tumors in this location are included in Area H (eyelids, eyebrows, nose, lips, chin, ear, pre-auricular, post-auricular, temple, genitalia, hands, feet, ankles and areola).  Tissue conservation is critical in these anatomic locations. Consent 2/Introductory Paragraph: Mohs surgery was explained to the patient and consent was obtained. The risks, benefits and alternatives to therapy were discussed in detail. Specifically, the risks of infection, scarring, bleeding, prolonged wound healing, incomplete removal, allergy to anesthesia, nerve injury and recurrence were addressed. Prior to the procedure, the treatment site was clearly identified and confirmed by the patient. All components of Universal Protocol/PAUSE Rule completed. Ftsg Text: The defect edges were debeveled with a #15 scalpel blade.  Given the location of the defect, shape of the defect and the proximity to free margins a full thickness skin graft was deemed most appropriate.  Using a sterile surgical marker, the primary defect shape was transferred to the donor site. The area thus outlined was incised deep to adipose tissue with a #15 scalpel blade.  The harvested graft was then trimmed of adipose tissue until only dermis and epidermis was left.  The skin margins of the secondary defect were undermined to an appropriate distance in all directions utilizing iris scissors.  The secondary defect was closed with interrupted buried subcutaneous sutures.  The skin edges were then re-apposed with running  sutures.  The skin graft was then placed in the primary defect and oriented appropriately. Consent (Nose)/Introductory Paragraph: The rationale for Mohs was explained to the patient and consent was obtained. The risks, benefits and alternatives to therapy were discussed in detail. Specifically, the risks of nasal deformity, changes in the flow of air through the nose, infection, scarring, bleeding, prolonged wound healing, incomplete removal, allergy to anesthesia, nerve injury and recurrence were addressed. Prior to the procedure, the treatment site was clearly identified and confirmed by the patient. All components of Universal Protocol/PAUSE Rule completed. Information: Selecting Yes will display possible errors in your note based on the variables you have selected. This validation is only offered as a suggestion for you. PLEASE NOTE THAT THE VALIDATION TEXT WILL BE REMOVED WHEN YOU FINALIZE YOUR NOTE. IF YOU WANT TO FAX A PRELIMINARY NOTE YOU WILL NEED TO TOGGLE THIS TO 'NO' IF YOU DO NOT WANT IT IN YOUR FAXED NOTE. Additional Anesthesia Volume In Cc: 6 Unna Boot Text: An Unna boot was placed to help immobilize the limb and facilitate more rapid healing. Double Island Pedicle Flap Text: The defect edges were debeveled with a #15 scalpel blade.  Given the location of the defect, shape of the defect and the proximity to free margins a double island pedicle advancement flap was deemed most appropriate.  Using a sterile surgical marker, an appropriate advancement flap was drawn incorporating the defect, outlining the appropriate donor tissue and placing the expected incisions within the relaxed skin tension lines where possible.    The area thus outlined was incised deep to adipose tissue with a #15 scalpel blade.  The skin margins were undermined to an appropriate distance in all directions around the primary defect and laterally outward around the island pedicle utilizing iris scissors.  There was minimal undermining beneath the pedicle flap. Partial Purse String (Intermediate) Text: Given the location of the defect and the characteristics of the surrounding skin an intermediate purse string closure was deemed most appropriate.  Undermining was performed circumfirentially around the surgical defect.  A purse string suture was then placed and tightened. Wound tension only allowed a partial closure of the circular defect. Cheek Interpolation Flap Text: A decision was made to reconstruct the defect utilizing an interpolation axial flap and a staged reconstruction.  A telfa template was made of the defect.  This telfa template was then used to outline the Cheek Interpolation flap.  The donor area for the pedicle flap was then injected with anesthesia.  The flap was excised through the skin and subcutaneous tissue down to the layer of the underlying musculature.  The interpolation flap was carefully excised within this deep plane to maintain its blood supply.  The edges of the donor site were undermined.   The donor site was closed in a primary fashion.  The pedicle was then rotated into position and sutured.  Once the tube was sutured into place, adequate blood supply was confirmed with blanching and refill.  The pedicle was then wrapped with xeroform gauze and dressed appropriately with a telfa and gauze bandage to ensure continued blood supply and protect the attached pedicle. Dermal Autograft Text: The defect edges were debeveled with a #15 scalpel blade.  Given the location of the defect, shape of the defect and the proximity to free margins a dermal autograft was deemed most appropriate.  Using a sterile surgical marker, the primary defect shape was transferred to the donor site. The area thus outlined was incised deep to adipose tissue with a #15 scalpel blade.  The harvested graft was then trimmed of adipose and epidermal tissue until only dermis was left.  The skin graft was then placed in the primary defect and oriented appropriately. Repair Type: Complex Repair Wound Care (No Sutures): Petrolatum Mercedes Flap Text: The defect edges were debeveled with a #15 scalpel blade.  Given the location of the defect, shape of the defect and the proximity to free margins a Mercedes flap was deemed most appropriate.  Using a sterile surgical marker, an appropriate advancement flap was drawn incorporating the defect and placing the expected incisions within the relaxed skin tension lines where possible. The area thus outlined was incised deep to adipose tissue with a #15 scalpel blade.  The skin margins were undermined to an appropriate distance in all directions utilizing iris scissors. Epidermal Closure Graft Donor Site (Optional): running Nasalis-Muscle-Based Myocutaneous Island Pedicle Flap Text: Using a #15 blade, an incision was made around the donor flap to the level of the nasalis muscle. Wide lateral undermining was then performed in both the subcutaneous plane above the nasalis muscle, and in a submuscular plane just above periosteum. This allowed the formation of a free nasalis muscle axial pedicle (based on the angular artery) which was still attached to the actual cutaneous flap, increasing its mobility and vascular viability. Hemostasis was obtained with pinpoint electrocoagulation. The flap was mobilized into position and the pivotal anchor points positioned and stabilized with buried interrupted sutures. Subcutaneous and dermal tissues were closed in a multilayered fashion with sutures. Tissue redundancies were excised, and the epidermal edges were apposed without significant tension and sutured with sutures. Advancement Flap (Double) Text: The defect edges were debeveled with a #15 scalpel blade.  Given the location of the defect and the proximity to free margins a double advancement flap was deemed most appropriate.  Using a sterile surgical marker, the appropriate advancement flaps were drawn incorporating the defect and placing the expected incisions within the relaxed skin tension lines where possible.    The area thus outlined was incised deep to adipose tissue with a #15 scalpel blade.  The skin margins were undermined to an appropriate distance in all directions utilizing iris scissors. Composite Graft Text: The defect edges were debeveled with a #15 scalpel blade.  Given the location of the defect, shape of the defect, the proximity to free margins and the fact the defect was full thickness a composite graft was deemed most appropriate.  The defect was outline and then transferred to the donor site.  A full thickness graft was then excised from the donor site. The graft was then placed in the primary defect, oriented appropriately and then sutured into place.  The secondary defect was then repaired using a primary closure. Helical Rim Text: The closure involved the helical rim. Consent (Near Eyelid Margin)/Introductory Paragraph: The rationale for Mohs was explained to the patient and consent was obtained. The risks, benefits and alternatives to therapy were discussed in detail. Specifically, the risks of ectropion or eyelid deformity, infection, scarring, bleeding, prolonged wound healing, incomplete removal, allergy to anesthesia, nerve injury and recurrence were addressed. Prior to the procedure, the treatment site was clearly identified and confirmed by the patient. All components of Universal Protocol/PAUSE Rule completed. Ear Wedge Repair Text: A wedge excision was completed by carrying down an excision through the full thickness of the ear and cartilage with an inward facing Burow's triangle. The wound was then closed in a layered fashion. Double Z Plasty Text: The lesion was extirpated to the level of the fat with a #15 scalpel blade. Given the location of the defect, shape of the defect and the proximity to free margins a double Z-plasty was deemed most appropriate for repair. Using a sterile surgical marker, the appropriate transposition arms of the double Z-plasty were drawn incorporating the defect and placing the expected incisions within the relaxed skin tension lines where possible. The area thus outlined was incised deep to adipose tissue with a #15 scalpel blade. The skin margins were undermined to an appropriate distance in all directions utilizing iris scissors. The opposing transposition arms were then transposed and carried over into place in opposite direction and anchored with interrupted buried subcutaneous sutures. Tarsorrhaphy Text: A tarsorrhaphy was performed using Frost sutures. O-T Plasty Text: The defect edges were debeveled with a #15 scalpel blade.  Given the location of the defect, shape of the defect and the proximity to free margins an O-T plasty was deemed most appropriate.  Using a sterile surgical marker, an appropriate O-T plasty was drawn incorporating the defect and placing the expected incisions within the relaxed skin tension lines where possible.    The area thus outlined was incised deep to adipose tissue with a #15 scalpel blade.  The skin margins were undermined to an appropriate distance in all directions utilizing iris scissors. Estimated Blood Loss (Cc): minimal Tumor Debulked?: curette Peng Advancement Flap Text: The defect edges were debeveled with a #15 scalpel blade.  Given the location of the defect, shape of the defect and the proximity to free margins a Peng advancement flap was deemed most appropriate.  Using a sterile surgical marker, an appropriate advancement flap was drawn incorporating the defect and placing the expected incisions within the relaxed skin tension lines where possible. The area thus outlined was incised deep to adipose tissue with a #15 scalpel blade.  The skin margins were undermined to an appropriate distance in all directions utilizing iris scissors. Purse String (Intermediate) Text: Given the location of the defect and the characteristics of the surrounding skin a pursestring intermediate closure was deemed most appropriate.  Undermining was performed circumfirentially around the surgical defect.  A purstring suture was then placed and tightened. Paramedian Forehead Flap Text: A decision was made to reconstruct the defect utilizing an interpolation axial flap and a staged reconstruction.  A telfa template was made of the defect.  This telfa template was then used to outline the paramedian forehead pedicle flap.  The donor area for the pedicle flap was then injected with anesthesia.  The flap was excised through the skin and subcutaneous tissue down to the layer of the underlying musculature.  The pedicle flap was carefully excised within this deep plane to maintain its blood supply.  The edges of the donor site were undermined.   The donor site was closed in a primary fashion.  The pedicle was then rotated into position and sutured.  Once the tube was sutured into place, adequate blood supply was confirmed with blanching and refill.  The pedicle was then wrapped with xeroform gauze and dressed appropriately with a telfa and gauze bandage to ensure continued blood supply and protect the attached pedicle. Graft Cartilage Fenestration Text: The cartilage was fenestrated with a 2mm punch biopsy to help facilitate graft survival and healing. Alternatives Discussed Intro (Do Not Add Period): I discussed alternative treatments to Mohs surgery and specifically discussed the risks and benefits of Interpolation Flap Text: A decision was made to reconstruct the defect utilizing an interpolation axial flap and a staged reconstruction.  A telfa template was made of the defect.  This telfa template was then used to outline the interpolation flap.  The donor area for the pedicle flap was then injected with anesthesia.  The flap was excised through the skin and subcutaneous tissue down to the layer of the underlying musculature.  The interpolation flap was carefully excised within this deep plane to maintain its blood supply.  The edges of the donor site were undermined.   The donor site was closed in a primary fashion.  The pedicle was then rotated into position and sutured.  Once the tube was sutured into place, adequate blood supply was confirmed with blanching and refill.  The pedicle was then wrapped with xeroform gauze and dressed appropriately with a telfa and gauze bandage to ensure continued blood supply and protect the attached pedicle.

## 2023-10-09 ENCOUNTER — EMERGENCY (EMERGENCY)
Facility: HOSPITAL | Age: 38
LOS: 1 days | Discharge: HOME | End: 2023-10-09
Attending: INTERNAL MEDICINE | Admitting: INTERNAL MEDICINE
Payer: SELF-PAY

## 2023-10-09 VITALS
OXYGEN SATURATION: 97 % | TEMPERATURE: 98 F | DIASTOLIC BLOOD PRESSURE: 81 MMHG | WEIGHT: 139.99 LBS | RESPIRATION RATE: 18 BRPM | SYSTOLIC BLOOD PRESSURE: 119 MMHG | HEART RATE: 86 BPM

## 2023-10-09 DIAGNOSIS — Z98.84 BARIATRIC SURGERY STATUS: Chronic | ICD-10-CM

## 2023-10-09 DIAGNOSIS — Z90.49 ACQUIRED ABSENCE OF OTHER SPECIFIED PARTS OF DIGESTIVE TRACT: Chronic | ICD-10-CM

## 2023-10-09 DIAGNOSIS — Z98.890 OTHER SPECIFIED POSTPROCEDURAL STATES: Chronic | ICD-10-CM

## 2023-10-09 DIAGNOSIS — Z98.51 TUBAL LIGATION STATUS: Chronic | ICD-10-CM

## 2023-10-09 PROCEDURE — 72074 X-RAY EXAM THORAC SPINE4/>VW: CPT

## 2023-10-09 PROCEDURE — 72074 X-RAY EXAM THORAC SPINE4/>VW: CPT | Mod: 26

## 2023-10-09 PROCEDURE — 72110 X-RAY EXAM L-2 SPINE 4/>VWS: CPT

## 2023-10-09 PROCEDURE — 99284 EMERGENCY DEPT VISIT MOD MDM: CPT | Mod: 25

## 2023-10-09 PROCEDURE — 72110 X-RAY EXAM L-2 SPINE 4/>VWS: CPT | Mod: 26

## 2023-10-09 PROCEDURE — 99285 EMERGENCY DEPT VISIT HI MDM: CPT

## 2023-10-09 PROCEDURE — 73030 X-RAY EXAM OF SHOULDER: CPT

## 2023-10-09 PROCEDURE — 70450 CT HEAD/BRAIN W/O DYE: CPT | Mod: MA

## 2023-10-09 PROCEDURE — 73030 X-RAY EXAM OF SHOULDER: CPT | Mod: 26,LT

## 2023-10-09 PROCEDURE — 70450 CT HEAD/BRAIN W/O DYE: CPT | Mod: 26,MA

## 2023-10-09 RX ORDER — LIDOCAINE 4 G/100G
1 CREAM TOPICAL ONCE
Refills: 0 | Status: COMPLETED | OUTPATIENT
Start: 2023-10-09 | End: 2023-10-09

## 2023-10-09 RX ORDER — HYDROXYZINE HCL 10 MG
25 TABLET ORAL ONCE
Refills: 0 | Status: COMPLETED | OUTPATIENT
Start: 2023-10-09 | End: 2023-10-09

## 2023-10-09 RX ORDER — IBUPROFEN 200 MG
600 TABLET ORAL ONCE
Refills: 0 | Status: COMPLETED | OUTPATIENT
Start: 2023-10-09 | End: 2023-10-09

## 2023-10-09 RX ADMIN — Medication 25 MILLIGRAM(S): at 22:01

## 2023-10-09 RX ADMIN — Medication 600 MILLIGRAM(S): at 22:00

## 2023-10-09 RX ADMIN — LIDOCAINE 1 PATCH: 4 CREAM TOPICAL at 22:01

## 2023-10-09 RX ADMIN — Medication 600 MILLIGRAM(S): at 22:30
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
## 2023-10-10 VITALS
SYSTOLIC BLOOD PRESSURE: 122 MMHG | OXYGEN SATURATION: 98 % | RESPIRATION RATE: 18 BRPM | HEART RATE: 78 BPM | DIASTOLIC BLOOD PRESSURE: 78 MMHG

## 2023-10-10 RX ORDER — LIDOCAINE 4 G/100G
1 CREAM TOPICAL
Qty: 2 | Refills: 0
Start: 2023-10-10 | End: 2023-10-19

## 2023-10-10 RX ORDER — IBUPROFEN 200 MG
1 TABLET ORAL
Qty: 40 | Refills: 0
Start: 2023-10-10 | End: 2023-10-19

## 2023-10-10 RX ORDER — CYCLOBENZAPRINE HYDROCHLORIDE 10 MG/1
1 TABLET, FILM COATED ORAL
Qty: 30 | Refills: 0
Start: 2023-10-10 | End: 2023-10-19
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
## 2023-10-10 NOTE — ED PROVIDER NOTE - PATIENT PORTAL LINK FT
You can access the FollowMyHealth Patient Portal offered by [facility] by registering at the following website: [email or web address]. By joining "[employer]’s FollowMyHealth portal, you will also be able to view your health information using other applications (apps) compatible with our system.

## 2023-10-10 NOTE — ED PROVIDER NOTE - IV ALTEPLASE EXCL ABS HIDDEN
Patient's daughter, Alverto Pope informed. She states understanding and has no further questions at this time. show
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
## 2023-10-10 NOTE — ED PROVIDER NOTE - CARE PROVIDER_API CALL
Chintan Garnett  Orthopaedic Surgery  [address] [place] [address], [address]  [place], [place] [id number]  Phone: [phone]  Fax: [phone]  Follow Up Time:

## 2023-10-10 NOTE — ED PROVIDER NOTE - PHYSICAL EXAMINATION
General:     NAD, well-nourished, well-appearing  Head:     NC/AT, EOMI, oral mucosa moist  Neck:     trachea midline  Lungs:     CTA b/l, no w/r/r  CVS:     S1S2, RRR, no m/g/r  Abd:     +BS, s/nt/nd, no organomegaly  Ext:    2+ radial and pedal pulses, no c/c/e  Neuro: AAOx3, no sensory/motor deficits  MSK–no C-spine tenderness midline positive midline T-spine and L-spine tenderness positive tenderness in the left shoulder full range of motion left shoulder
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
## 2023-10-10 NOTE — ED PROVIDER NOTE - CARE PROVIDERS DIRECT ADDRESSES
[email or web address]

## 2023-10-10 NOTE — ED PROVIDER NOTE - NSICDXFAMILYHX_GEN_ALL_CORE_FT
FAMILY HISTORY:  Mother  Still living? Unknown  Family history of diabetes mellitus, Age at diagnosis: Age Unknown    Sibling  Still living? Yes, Estimated age: Age Unknown  Family history of diabetes mellitus, Age at diagnosis: Age Unknown  Family history of thyroid cancer, Age at diagnosis: Age Unknown

## 2023-11-07 ENCOUNTER — NON-APPOINTMENT (OUTPATIENT)
Age: 38
End: 2023-11-07

## 2023-11-30 NOTE — ED ADULT NURSE NOTE - NS ED NURSE RECORD ANOTHER HT AND WT
Received notification from pharmacy that differin gel needed PA.  Called [facility] [place] and spoke to pharmacy Chong nazario, and she said they did get a paid claim for the rx and it's ready for the family to .  Closing encounter.  
No

## 2024-07-02 ENCOUNTER — EMERGENCY (EMERGENCY)
Facility: HOSPITAL | Age: 39
LOS: 1 days | Discharge: HOME | End: 2024-07-02
Attending: STUDENT IN AN ORGANIZED HEALTH CARE EDUCATION/TRAINING PROGRAM | Admitting: STUDENT IN AN ORGANIZED HEALTH CARE EDUCATION/TRAINING PROGRAM
Payer: COMMERCIAL

## 2024-07-02 VITALS
TEMPERATURE: 98 F | OXYGEN SATURATION: 100 % | DIASTOLIC BLOOD PRESSURE: 83 MMHG | WEIGHT: 149.91 LBS | RESPIRATION RATE: 18 BRPM | HEART RATE: 90 BPM | HEIGHT: 65 IN | SYSTOLIC BLOOD PRESSURE: 123 MMHG

## 2024-07-02 DIAGNOSIS — Z98.51 TUBAL LIGATION STATUS: Chronic | ICD-10-CM

## 2024-07-02 DIAGNOSIS — Z98.84 BARIATRIC SURGERY STATUS: Chronic | ICD-10-CM

## 2024-07-02 DIAGNOSIS — Z90.49 ACQUIRED ABSENCE OF OTHER SPECIFIED PARTS OF DIGESTIVE TRACT: Chronic | ICD-10-CM

## 2024-07-02 DIAGNOSIS — Z98.890 OTHER SPECIFIED POSTPROCEDURAL STATES: Chronic | ICD-10-CM

## 2024-07-02 PROCEDURE — 99283 EMERGENCY DEPT VISIT LOW MDM: CPT

## 2024-07-02 PROCEDURE — 99284 EMERGENCY DEPT VISIT MOD MDM: CPT | Mod: 25

## 2024-07-02 PROCEDURE — 12001 RPR S/N/AX/GEN/TRNK 2.5CM/<: CPT

## 2024-07-02 RX ORDER — AMOXICILLIN/POTASSIUM CLAV 250-125 MG
875 TABLET ORAL
Qty: 14 | Refills: 0
Start: 2024-07-02 | End: 2024-07-08

## 2025-01-21 ENCOUNTER — NON-APPOINTMENT (OUTPATIENT)
Age: 40
End: 2025-01-21

## 2025-01-27 NOTE — ED ADULT NURSE NOTE - TEMPLATE LIST FOR HEAD TO TOE ASSESSMENT
----- Message from David Acosta General

## 2025-09-02 ENCOUNTER — NON-APPOINTMENT (OUTPATIENT)
Age: 40
End: 2025-09-02

## 2025-09-02 ENCOUNTER — EMERGENCY (EMERGENCY)
Facility: HOSPITAL | Age: 40
LOS: 1 days | End: 2025-09-02
Attending: EMERGENCY MEDICINE | Admitting: EMERGENCY MEDICINE
Payer: COMMERCIAL

## 2025-09-02 VITALS
TEMPERATURE: 98 F | OXYGEN SATURATION: 99 % | DIASTOLIC BLOOD PRESSURE: 68 MMHG | SYSTOLIC BLOOD PRESSURE: 110 MMHG | HEART RATE: 84 BPM

## 2025-09-02 VITALS
OXYGEN SATURATION: 100 % | SYSTOLIC BLOOD PRESSURE: 141 MMHG | RESPIRATION RATE: 18 BRPM | WEIGHT: 139.99 LBS | HEART RATE: 104 BPM | TEMPERATURE: 98 F | HEIGHT: 65 IN | DIASTOLIC BLOOD PRESSURE: 94 MMHG

## 2025-09-02 DIAGNOSIS — Z98.890 OTHER SPECIFIED POSTPROCEDURAL STATES: Chronic | ICD-10-CM

## 2025-09-02 DIAGNOSIS — Z90.49 ACQUIRED ABSENCE OF OTHER SPECIFIED PARTS OF DIGESTIVE TRACT: Chronic | ICD-10-CM

## 2025-09-02 DIAGNOSIS — Z98.51 TUBAL LIGATION STATUS: Chronic | ICD-10-CM

## 2025-09-02 DIAGNOSIS — Z98.84 BARIATRIC SURGERY STATUS: Chronic | ICD-10-CM

## 2025-09-02 LAB
ALBUMIN SERPL ELPH-MCNC: 3.6 G/DL — SIGNIFICANT CHANGE UP (ref 3.3–5)
ALP SERPL-CCNC: 69 U/L — SIGNIFICANT CHANGE UP (ref 40–120)
ALT FLD-CCNC: 12 U/L — SIGNIFICANT CHANGE UP (ref 10–45)
ANION GAP SERPL CALC-SCNC: 5 MMOL/L — SIGNIFICANT CHANGE UP (ref 5–17)
APPEARANCE UR: CLEAR — SIGNIFICANT CHANGE UP
AST SERPL-CCNC: 10 U/L — SIGNIFICANT CHANGE UP (ref 10–40)
BASOPHILS # BLD AUTO: 0.07 K/UL — SIGNIFICANT CHANGE UP (ref 0–0.2)
BASOPHILS NFR BLD AUTO: 0.8 % — SIGNIFICANT CHANGE UP (ref 0–2)
BILIRUB SERPL-MCNC: 0.7 MG/DL — SIGNIFICANT CHANGE UP (ref 0.2–1.2)
BILIRUB UR-MCNC: NEGATIVE — SIGNIFICANT CHANGE UP
BUN SERPL-MCNC: 18 MG/DL — SIGNIFICANT CHANGE UP (ref 7–23)
CALCIUM SERPL-MCNC: 8.7 MG/DL — SIGNIFICANT CHANGE UP (ref 8.4–10.5)
CHLORIDE SERPL-SCNC: 108 MMOL/L — SIGNIFICANT CHANGE UP (ref 96–108)
CO2 SERPL-SCNC: 27 MMOL/L — SIGNIFICANT CHANGE UP (ref 22–31)
COLOR SPEC: YELLOW — SIGNIFICANT CHANGE UP
CREAT SERPL-MCNC: 0.46 MG/DL — LOW (ref 0.5–1.3)
DIFF PNL FLD: NEGATIVE — SIGNIFICANT CHANGE UP
EGFR: 123 ML/MIN/1.73M2 — SIGNIFICANT CHANGE UP
EGFR: 123 ML/MIN/1.73M2 — SIGNIFICANT CHANGE UP
EOSINOPHIL # BLD AUTO: 0.14 K/UL — SIGNIFICANT CHANGE UP (ref 0–0.5)
EOSINOPHIL NFR BLD AUTO: 1.7 % — SIGNIFICANT CHANGE UP (ref 0–6)
GLUCOSE BLDC GLUCOMTR-MCNC: 78 MG/DL — SIGNIFICANT CHANGE UP (ref 70–99)
GLUCOSE SERPL-MCNC: 91 MG/DL — SIGNIFICANT CHANGE UP (ref 70–99)
GLUCOSE UR QL: NEGATIVE MG/DL — SIGNIFICANT CHANGE UP
HCG SERPL-ACNC: <1 MIU/ML — SIGNIFICANT CHANGE UP
HCT VFR BLD CALC: 37.1 % — SIGNIFICANT CHANGE UP (ref 34.5–45)
HGB BLD-MCNC: 12.3 G/DL — SIGNIFICANT CHANGE UP (ref 11.5–15.5)
IMM GRANULOCYTES NFR BLD AUTO: 0.5 % — SIGNIFICANT CHANGE UP (ref 0–0.9)
KETONES UR QL: NEGATIVE MG/DL — SIGNIFICANT CHANGE UP
LEUKOCYTE ESTERASE UR-ACNC: NEGATIVE — SIGNIFICANT CHANGE UP
LIDOCAIN IGE QN: 20 U/L — SIGNIFICANT CHANGE UP (ref 16–77)
LYMPHOCYTES # BLD AUTO: 2.67 K/UL — SIGNIFICANT CHANGE UP (ref 1–3.3)
LYMPHOCYTES # BLD AUTO: 31.6 % — SIGNIFICANT CHANGE UP (ref 13–44)
MCHC RBC-ENTMCNC: 29.4 PG — SIGNIFICANT CHANGE UP (ref 27–34)
MCHC RBC-ENTMCNC: 33.2 G/DL — SIGNIFICANT CHANGE UP (ref 32–36)
MCV RBC AUTO: 88.5 FL — SIGNIFICANT CHANGE UP (ref 80–100)
MONOCYTES # BLD AUTO: 0.72 K/UL — SIGNIFICANT CHANGE UP (ref 0–0.9)
MONOCYTES NFR BLD AUTO: 8.5 % — SIGNIFICANT CHANGE UP (ref 2–14)
NEUTROPHILS # BLD AUTO: 4.82 K/UL — SIGNIFICANT CHANGE UP (ref 1.8–7.4)
NEUTROPHILS NFR BLD AUTO: 56.9 % — SIGNIFICANT CHANGE UP (ref 43–77)
NITRITE UR-MCNC: NEGATIVE — SIGNIFICANT CHANGE UP
NRBC BLD AUTO-RTO: 0 /100 WBCS — SIGNIFICANT CHANGE UP (ref 0–0)
PH UR: 6.5 — SIGNIFICANT CHANGE UP (ref 5–8)
PLATELET # BLD AUTO: 309 K/UL — SIGNIFICANT CHANGE UP (ref 150–400)
POTASSIUM SERPL-MCNC: 3.8 MMOL/L — SIGNIFICANT CHANGE UP (ref 3.5–5.3)
POTASSIUM SERPL-SCNC: 3.8 MMOL/L — SIGNIFICANT CHANGE UP (ref 3.5–5.3)
PROT SERPL-MCNC: 6.9 G/DL — SIGNIFICANT CHANGE UP (ref 6–8.3)
PROT UR-MCNC: NEGATIVE MG/DL — SIGNIFICANT CHANGE UP
RBC # BLD: 4.19 M/UL — SIGNIFICANT CHANGE UP (ref 3.8–5.2)
RBC # FLD: 13.6 % — SIGNIFICANT CHANGE UP (ref 10.3–14.5)
SODIUM SERPL-SCNC: 140 MMOL/L — SIGNIFICANT CHANGE UP (ref 135–145)
SP GR SPEC: 1.03 — SIGNIFICANT CHANGE UP (ref 1–1.03)
T3 SERPL-MCNC: 75 NG/DL — LOW (ref 80–200)
T4 AB SER-ACNC: 6.2 UG/DL — SIGNIFICANT CHANGE UP (ref 4.6–12)
T4 FREE SERPL-MCNC: 1.2 NG/DL — SIGNIFICANT CHANGE UP (ref 0.9–1.8)
TROPONIN I, HIGH SENSITIVITY RESULT: <4 NG/L — SIGNIFICANT CHANGE UP
TSH SERPL-MCNC: 0.95 UIU/ML — SIGNIFICANT CHANGE UP (ref 0.36–3.74)
UROBILINOGEN FLD QL: 1 MG/DL — SIGNIFICANT CHANGE UP (ref 0.2–1)
WBC # BLD: 8.46 K/UL — SIGNIFICANT CHANGE UP (ref 3.8–10.5)
WBC # FLD AUTO: 8.46 K/UL — SIGNIFICANT CHANGE UP (ref 3.8–10.5)

## 2025-09-02 PROCEDURE — 84480 ASSAY TRIIODOTHYRONINE (T3): CPT

## 2025-09-02 PROCEDURE — 87086 URINE CULTURE/COLONY COUNT: CPT

## 2025-09-02 PROCEDURE — 93010 ELECTROCARDIOGRAM REPORT: CPT

## 2025-09-02 PROCEDURE — 93005 ELECTROCARDIOGRAM TRACING: CPT

## 2025-09-02 PROCEDURE — 84436 ASSAY OF TOTAL THYROXINE: CPT

## 2025-09-02 PROCEDURE — 99284 EMERGENCY DEPT VISIT MOD MDM: CPT | Mod: 25

## 2025-09-02 PROCEDURE — 84484 ASSAY OF TROPONIN QUANT: CPT

## 2025-09-02 PROCEDURE — 96374 THER/PROPH/DIAG INJ IV PUSH: CPT

## 2025-09-02 PROCEDURE — 85025 COMPLETE CBC W/AUTO DIFF WBC: CPT

## 2025-09-02 PROCEDURE — 81003 URINALYSIS AUTO W/O SCOPE: CPT

## 2025-09-02 PROCEDURE — 84439 ASSAY OF FREE THYROXINE: CPT

## 2025-09-02 PROCEDURE — 36415 COLL VENOUS BLD VENIPUNCTURE: CPT

## 2025-09-02 PROCEDURE — 84702 CHORIONIC GONADOTROPIN TEST: CPT

## 2025-09-02 PROCEDURE — 99284 EMERGENCY DEPT VISIT MOD MDM: CPT

## 2025-09-02 PROCEDURE — 84443 ASSAY THYROID STIM HORMONE: CPT

## 2025-09-02 PROCEDURE — 80053 COMPREHEN METABOLIC PANEL: CPT

## 2025-09-02 PROCEDURE — 82962 GLUCOSE BLOOD TEST: CPT

## 2025-09-02 PROCEDURE — 83690 ASSAY OF LIPASE: CPT

## 2025-09-02 RX ORDER — ACETAMINOPHEN 500 MG/5ML
1000 LIQUID (ML) ORAL ONCE
Refills: 0 | Status: COMPLETED | OUTPATIENT
Start: 2025-09-02 | End: 2025-09-02

## 2025-09-02 RX ORDER — METOCLOPRAMIDE HCL 10 MG
10 TABLET ORAL ONCE
Refills: 0 | Status: DISCONTINUED | OUTPATIENT
Start: 2025-09-02 | End: 2025-09-02

## 2025-09-02 RX ADMIN — Medication 1000 MILLILITER(S): at 12:45

## 2025-09-02 RX ADMIN — Medication 400 MILLIGRAM(S): at 13:35

## 2025-09-03 LAB
CULTURE RESULTS: SIGNIFICANT CHANGE UP
SPECIMEN SOURCE: SIGNIFICANT CHANGE UP

## 2025-09-17 ENCOUNTER — APPOINTMENT (OUTPATIENT)
Dept: FAMILY MEDICINE | Facility: CLINIC | Age: 40
End: 2025-09-17